# Patient Record
Sex: MALE | Race: WHITE | NOT HISPANIC OR LATINO | URBAN - METROPOLITAN AREA
[De-identification: names, ages, dates, MRNs, and addresses within clinical notes are randomized per-mention and may not be internally consistent; named-entity substitution may affect disease eponyms.]

---

## 2018-11-09 ENCOUNTER — OUTPATIENT (OUTPATIENT)
Dept: OUTPATIENT SERVICES | Facility: HOSPITAL | Age: 38
LOS: 1 days | End: 2018-11-09
Payer: COMMERCIAL

## 2018-11-09 VITALS
HEIGHT: 66 IN | WEIGHT: 171.08 LBS | HEART RATE: 79 BPM | OXYGEN SATURATION: 97 % | DIASTOLIC BLOOD PRESSURE: 80 MMHG | TEMPERATURE: 98 F | RESPIRATION RATE: 16 BRPM | SYSTOLIC BLOOD PRESSURE: 125 MMHG

## 2018-11-09 DIAGNOSIS — Z01.818 ENCOUNTER FOR OTHER PREPROCEDURAL EXAMINATION: ICD-10-CM

## 2018-11-09 DIAGNOSIS — Z98.890 OTHER SPECIFIED POSTPROCEDURAL STATES: Chronic | ICD-10-CM

## 2018-11-09 DIAGNOSIS — K64.9 UNSPECIFIED HEMORRHOIDS: ICD-10-CM

## 2018-11-09 DIAGNOSIS — K62.5 HEMORRHAGE OF ANUS AND RECTUM: ICD-10-CM

## 2018-11-09 DIAGNOSIS — G89.29 OTHER CHRONIC PAIN: ICD-10-CM

## 2018-11-09 LAB
HCT VFR BLD CALC: 45.3 % — SIGNIFICANT CHANGE UP (ref 39–50)
HGB BLD-MCNC: 15 G/DL — SIGNIFICANT CHANGE UP (ref 13–17)
HIV 1+2 AB+HIV1 P24 AG SERPL QL IA: SIGNIFICANT CHANGE UP
MCHC RBC-ENTMCNC: 30.7 PG — SIGNIFICANT CHANGE UP (ref 27–34)
MCHC RBC-ENTMCNC: 33.1 GM/DL — SIGNIFICANT CHANGE UP (ref 32–36)
MCV RBC AUTO: 92.8 FL — SIGNIFICANT CHANGE UP (ref 80–100)
PLATELET # BLD AUTO: 221 K/UL — SIGNIFICANT CHANGE UP (ref 150–400)
RBC # BLD: 4.88 M/UL — SIGNIFICANT CHANGE UP (ref 4.2–5.8)
RBC # FLD: 12.8 % — SIGNIFICANT CHANGE UP (ref 10.3–14.5)
WBC # BLD: 5.21 K/UL — SIGNIFICANT CHANGE UP (ref 3.8–10.5)
WBC # FLD AUTO: 5.21 K/UL — SIGNIFICANT CHANGE UP (ref 3.8–10.5)

## 2018-11-09 PROCEDURE — 85027 COMPLETE CBC AUTOMATED: CPT

## 2018-11-09 PROCEDURE — 87389 HIV-1 AG W/HIV-1&-2 AB AG IA: CPT

## 2018-11-09 PROCEDURE — G0463: CPT

## 2018-11-09 RX ORDER — LIDOCAINE HCL 20 MG/ML
0.2 VIAL (ML) INJECTION ONCE
Qty: 0 | Refills: 0 | Status: DISCONTINUED | OUTPATIENT
Start: 2018-11-20 | End: 2018-12-05

## 2018-11-09 RX ORDER — SODIUM CHLORIDE 9 MG/ML
3 INJECTION INTRAMUSCULAR; INTRAVENOUS; SUBCUTANEOUS EVERY 8 HOURS
Qty: 0 | Refills: 0 | Status: DISCONTINUED | OUTPATIENT
Start: 2018-11-20 | End: 2018-12-05

## 2018-11-09 NOTE — H&P PST ADULT - HISTORY OF PRESENT ILLNESS
38 year old male with a history of hemorrhoids presents for hemorrhoidectomy. Pt. s/p banding procedure which was unsuccessful. denies rectal bleeding.

## 2018-11-09 NOTE — H&P PST ADULT - PMH
Chronic pain of both shoulders  managed by Dr. Horowitz pain management MD, [facility]  Fracture  history of sports related fractures in childhood

## 2018-11-19 ENCOUNTER — TRANSCRIPTION ENCOUNTER (OUTPATIENT)
Age: 38
End: 2018-11-19

## 2018-11-19 RX ORDER — ONDANSETRON 8 MG/1
4 TABLET, FILM COATED ORAL ONCE
Qty: 0 | Refills: 0 | Status: DISCONTINUED | OUTPATIENT
Start: 2018-11-20 | End: 2018-12-05

## 2018-11-19 RX ORDER — CELECOXIB 200 MG/1
200 CAPSULE ORAL ONCE
Qty: 0 | Refills: 0 | Status: DISCONTINUED | OUTPATIENT
Start: 2018-11-20 | End: 2018-12-05

## 2018-11-19 RX ORDER — OXYCODONE HYDROCHLORIDE 5 MG/1
5 TABLET ORAL ONCE
Qty: 0 | Refills: 0 | Status: DISCONTINUED | OUTPATIENT
Start: 2018-11-20 | End: 2018-11-20

## 2018-11-19 RX ORDER — SODIUM CHLORIDE 9 MG/ML
1000 INJECTION, SOLUTION INTRAVENOUS
Qty: 0 | Refills: 0 | Status: DISCONTINUED | OUTPATIENT
Start: 2018-11-20 | End: 2018-12-05

## 2018-11-20 ENCOUNTER — OUTPATIENT (OUTPATIENT)
Dept: OUTPATIENT SERVICES | Facility: HOSPITAL | Age: 38
LOS: 1 days | End: 2018-11-20
Payer: COMMERCIAL

## 2018-11-20 ENCOUNTER — RESULT REVIEW (OUTPATIENT)
Age: 38
End: 2018-11-20

## 2018-11-20 VITALS
TEMPERATURE: 97 F | SYSTOLIC BLOOD PRESSURE: 117 MMHG | HEART RATE: 61 BPM | OXYGEN SATURATION: 99 % | RESPIRATION RATE: 20 BRPM | DIASTOLIC BLOOD PRESSURE: 86 MMHG

## 2018-11-20 VITALS
TEMPERATURE: 98 F | SYSTOLIC BLOOD PRESSURE: 125 MMHG | DIASTOLIC BLOOD PRESSURE: 80 MMHG | HEART RATE: 79 BPM | WEIGHT: 171.08 LBS | OXYGEN SATURATION: 98 % | HEIGHT: 66 IN | RESPIRATION RATE: 16 BRPM

## 2018-11-20 DIAGNOSIS — K62.5 HEMORRHAGE OF ANUS AND RECTUM: ICD-10-CM

## 2018-11-20 DIAGNOSIS — Z98.890 OTHER SPECIFIED POSTPROCEDURAL STATES: Chronic | ICD-10-CM

## 2018-11-20 PROCEDURE — 46260 REMOVE IN/EX HEM GROUPS 2+: CPT

## 2018-11-20 PROCEDURE — 88304 TISSUE EXAM BY PATHOLOGIST: CPT

## 2018-11-20 PROCEDURE — 88304 TISSUE EXAM BY PATHOLOGIST: CPT | Mod: 26

## 2018-11-20 RX ORDER — IBUPROFEN 200 MG
1 TABLET ORAL
Qty: 30 | Refills: 0
Start: 2018-11-20

## 2018-11-20 RX ORDER — CELECOXIB 200 MG/1
200 CAPSULE ORAL ONCE
Qty: 0 | Refills: 0 | Status: COMPLETED | OUTPATIENT
Start: 2018-11-20 | End: 2018-11-20

## 2018-11-20 RX ORDER — ACETAMINOPHEN WITH CODEINE 300MG-30MG
1 TABLET ORAL
Qty: 21 | Refills: 0 | OUTPATIENT
Start: 2018-11-20

## 2018-11-20 RX ORDER — ACETAMINOPHEN 500 MG
1000 TABLET ORAL ONCE
Qty: 0 | Refills: 0 | Status: COMPLETED | OUTPATIENT
Start: 2018-11-20 | End: 2018-11-20

## 2018-11-20 RX ORDER — ACETAMINOPHEN WITH CODEINE 300MG-30MG
1 TABLET ORAL
Qty: 21 | Refills: 0
Start: 2018-11-20

## 2018-11-20 RX ORDER — IBUPROFEN 200 MG
1 TABLET ORAL
Qty: 30 | Refills: 0 | OUTPATIENT
Start: 2018-11-20

## 2018-11-20 RX ADMIN — CELECOXIB 200 MILLIGRAM(S): 200 CAPSULE ORAL at 08:57

## 2018-11-20 RX ADMIN — Medication 1000 MILLIGRAM(S): at 08:57

## 2018-11-20 NOTE — ASU DISCHARGE PLAN (ADULT/PEDIATRIC). - SPECIAL INSTRUCTIONS
Pain control as needed with medications prescribed. Prescriptions have been sent to your pharmacy.    Take sitz baths twice a day and after bowel movements if able.    Please followup with Dr. Nunez in 1 week in office.

## 2018-11-20 NOTE — ASU DISCHARGE PLAN (ADULT/PEDIATRIC). - MEDICATION SUMMARY - MEDICATIONS TO TAKE
I will START or STAY ON the medications listed below when I get home from the hospital:    ibuprofen 800 mg oral tablet  -- 1 tab(s) by mouth every 8 hours, As Needed for pain  -- Do not take this drug if you are pregnant.  It is very important that you take or use this exactly as directed.  Do not skip doses or discontinue unless directed by your doctor.  May cause drowsiness or dizziness.  Obtain medical advice before taking any non-prescription drugs as some may affect the action of this medication.  Take with food or milk.    -- Indication: For Pain    Tylenol with Codeine #3 oral tablet  -- 1 tab(s) by mouth every 8 hours, As Needed for pain MDD:3 tabs  -- Caution federal law prohibits the transfer of this drug to any person other  than the person for whom it was prescribed.  May cause drowsiness.  Alcohol may intensify this effect.  Use care when operating dangerous machinery.  This product contains acetaminophen.  Do not use  with any other product containing acetaminophen to prevent possible liver damage.  Using more of this medication than prescribed may cause serious breathing problems.    -- Indication: For Pain    Marinol  -- 1 tab(s) by mouth once a day (at bedtime)  -- Indication: For Home med

## 2018-11-20 NOTE — PRE-ANESTHESIA EVALUATION ADULT - NSANTHOSAYNRD_GEN_A_CORE
No. DEBBY screening performed.  STOP BANG Legend: 0-2 = LOW Risk; 3-4 = INTERMEDIATE Risk; 5-8 = HIGH Risk

## 2018-11-20 NOTE — ASU DISCHARGE PLAN (ADULT/PEDIATRIC). - NOTIFY
Persistent Nausea and Vomiting/Unable to Urinate/Swelling that continues/Excessive Diarrhea/Pain not relieved by Medications/GYN Fever>100.4/Numbness, tingling/Bleeding that does not stop/Inability to Tolerate Liquids or Foods Excessive Diarrhea/Pain not relieved by Medications/Inability to Tolerate Liquids or Foods/Fever greater than 101/Bleeding that does not stop

## 2018-11-29 LAB — SURGICAL PATHOLOGY STUDY: SIGNIFICANT CHANGE UP

## 2019-06-11 PROBLEM — T14.8XXA OTHER INJURY OF UNSPECIFIED BODY REGION, INITIAL ENCOUNTER: Chronic | Status: ACTIVE | Noted: 2018-11-09

## 2019-06-11 PROBLEM — M25.511 PAIN IN RIGHT SHOULDER: Chronic | Status: ACTIVE | Noted: 2018-11-09

## 2019-07-30 ENCOUNTER — APPOINTMENT (OUTPATIENT)
Dept: GASTROENTEROLOGY | Facility: CLINIC | Age: 39
End: 2019-07-30
Payer: COMMERCIAL

## 2019-07-30 VITALS
TEMPERATURE: 98.6 F | HEART RATE: 72 BPM | WEIGHT: 170 LBS | HEIGHT: 67 IN | OXYGEN SATURATION: 99 % | DIASTOLIC BLOOD PRESSURE: 70 MMHG | BODY MASS INDEX: 26.68 KG/M2 | SYSTOLIC BLOOD PRESSURE: 110 MMHG

## 2019-07-30 DIAGNOSIS — R10.9 UNSPECIFIED ABDOMINAL PAIN: ICD-10-CM

## 2019-07-30 DIAGNOSIS — Z83.79 FAMILY HISTORY OF OTHER DISEASES OF THE DIGESTIVE SYSTEM: ICD-10-CM

## 2019-07-30 DIAGNOSIS — Z82.49 FAMILY HISTORY OF ISCHEMIC HEART DISEASE AND OTHER DISEASES OF THE CIRCULATORY SYSTEM: ICD-10-CM

## 2019-07-30 DIAGNOSIS — Z78.9 OTHER SPECIFIED HEALTH STATUS: ICD-10-CM

## 2019-07-30 DIAGNOSIS — K29.00 ACUTE GASTRITIS W/OUT BLEEDING: ICD-10-CM

## 2019-07-30 DIAGNOSIS — Z87.898 PERSONAL HISTORY OF OTHER SPECIFIED CONDITIONS: ICD-10-CM

## 2019-07-30 PROCEDURE — 99204 OFFICE O/P NEW MOD 45 MIN: CPT

## 2019-07-30 NOTE — PHYSICAL EXAM
[General Appearance - In No Acute Distress] : in no acute distress [General Appearance - Alert] : alert [Sclera] : the sclera and conjunctiva were normal [PERRL With Normal Accommodation] : pupils were equal in size, round, and reactive to light [Extraocular Movements] : extraocular movements were intact [Neck Appearance] : the appearance of the neck was normal [Oropharynx] : the oropharynx was normal [Outer Ear] : the ears and nose were normal in appearance [Jugular Venous Distention Increased] : there was no jugular-venous distention [Thyroid Diffuse Enlargement] : the thyroid was not enlarged [Neck Cervical Mass (___cm)] : no neck mass was observed [Thyroid Nodule] : there were no palpable thyroid nodules [Heart Rate And Rhythm] : heart rate was normal and rhythm regular [Auscultation Breath Sounds / Voice Sounds] : lungs were clear to auscultation bilaterally [Heart Sounds] : normal S1 and S2 [Heart Sounds Gallop] : no gallops [Bowel Sounds] : normal bowel sounds [Murmurs] : no murmurs [Heart Sounds Pericardial Friction Rub] : no pericardial rub [Abdomen Tenderness] : non-tender [Abdomen Mass (___ Cm)] : no abdominal mass palpated [Abdomen Soft] : soft [] : no hepato-splenomegaly [Cervical Lymph Nodes Enlarged Anterior Bilaterally] : anterior cervical [Cervical Lymph Nodes Enlarged Posterior Bilaterally] : posterior cervical [Supraclavicular Lymph Nodes Enlarged Bilaterally] : supraclavicular [No Spinal Tenderness] : no spinal tenderness [No CVA Tenderness] : no ~M costovertebral angle tenderness [Musculoskeletal - Swelling] : no joint swelling seen [Nail Clubbing] : no clubbing  or cyanosis of the fingernails [Abnormal Walk] : normal gait [Sensation] : the sensory exam was normal to light touch and pinprick [Motor Tone] : muscle strength and tone were normal [Deep Tendon Reflexes (DTR)] : deep tendon reflexes were 2+ and symmetric [No Focal Deficits] : no focal deficits

## 2019-07-30 NOTE — HISTORY OF PRESENT ILLNESS
[Heartburn] : denies heartburn [Vomiting] : denies vomiting [Nausea] : denies nausea [Diarrhea] : denies diarrhea [Constipation] : denies constipation [Yellow Skin Or Eyes (Jaundice)] : denies jaundice [Abdominal Swelling] : denies abdominal swelling [Abdominal Pain] : resolved abdominal pain [Rectal Pain] : denies rectal pain [Wt Gain ___ Lbs] : no recent weight gain [Wt Loss ___ Lbs] : no recent weight loss [GERD] : no gastroesophageal reflux disease [Hiatus Hernia] : no hiatus hernia [Peptic Ulcer Disease] : no peptic ulcer disease [Pancreatitis] : no pancreatitis [Cholelithiasis] : no cholelithiasis [Inflammatory Bowel Disease] : no inflammatory bowel disease [Kidney Stone] : no kidney stone [Diverticulitis] : no diverticulitis [Alcohol Abuse] : no alcohol abuse [Irritable Bowel Syndrome] : no irritable bowel syndrome [Abdominal Surgery] : no abdominal surgery [Malignancy] : no malignancy [Appendectomy] : no appendectomy [Cholecystectomy] : no cholecystectomy [de-identified] : 38 year old man approximately 11/2 months ago developed abnormal pain across his abdomen. The pain was crampy and lasted for several seconds. It was intermittent over 3-4 days. It was not associated with any foods and he had no diarrhea, constipation, nausea or vomiting. He denies rectal bleeding, melena or hematemesis. He had hemorrhoid surgery a year ago.

## 2019-09-20 NOTE — ASU PATIENT PROFILE, ADULT - NS SC CAGE ALCOHOL CUT DOWN
Electrodesiccation And Curettage Text: The wound bed was treated with electrodesiccation and curettage after the biopsy was performed. X Size Of Lesion In Cm: 1.3 Notification Instructions: Patient will be notified of biopsy results. However, patient instructed to call the office if not contacted within 2 weeks. Hemostasis: Drysol Render Path Notes In Note?: No Wound Care: Polysporin ointment Additional Anesthesia Volume In Cc (Will Not Render If 0): 0 Post-Care Instructions: I reviewed with the patient in detail post-care instructions. Patient is to keep the biopsy site dry overnight, and then apply bacitracin once daily until healed. Patient may use hydrogen peroxide remove any crusting. Depth Of Biopsy: dermis Dressing: bandage Detail Level: Detailed Cryotherapy Text: The wound bed was treated with cryotherapy after the biopsy was performed. Billing Type: Third-Party Bill Biopsy Type: H and E Silver Nitrate Text: The wound bed was treated with silver nitrate after the biopsy was performed. Anesthesia Volume In Cc: 0.5 Electrodesiccation Text: The wound bed was treated with electrodesiccation after the biopsy was performed. Curettage Text: The wound bed was treated with curettage after the biopsy was performed. Type Of Destruction Used: Curettage Was A Bandage Applied: Yes Biopsy Method: scissors Consent: Written consent was obtained and risks were reviewed including but not limited to scarring, infection, bleeding, scabbing, incomplete removal, nerve damage and allergy to anesthesia. Anesthesia Type: 1% lidocaine with epinephrine Size Of Lesion In Cm: 1.4 no

## 2020-09-20 ENCOUNTER — EMERGENCY (EMERGENCY)
Facility: HOSPITAL | Age: 40
LOS: 0 days | Discharge: HOME | End: 2020-09-20
Attending: STUDENT IN AN ORGANIZED HEALTH CARE EDUCATION/TRAINING PROGRAM
Payer: COMMERCIAL

## 2020-09-20 VITALS
SYSTOLIC BLOOD PRESSURE: 123 MMHG | OXYGEN SATURATION: 97 % | HEART RATE: 65 BPM | RESPIRATION RATE: 16 BRPM | TEMPERATURE: 98 F | DIASTOLIC BLOOD PRESSURE: 83 MMHG

## 2020-09-20 VITALS
OXYGEN SATURATION: 100 % | HEIGHT: 66 IN | SYSTOLIC BLOOD PRESSURE: 120 MMHG | RESPIRATION RATE: 17 BRPM | WEIGHT: 175.05 LBS | HEART RATE: 75 BPM | DIASTOLIC BLOOD PRESSURE: 66 MMHG | TEMPERATURE: 98 F

## 2020-09-20 DIAGNOSIS — G89.29 OTHER CHRONIC PAIN: ICD-10-CM

## 2020-09-20 DIAGNOSIS — M54.5 LOW BACK PAIN: ICD-10-CM

## 2020-09-20 DIAGNOSIS — M25.512 PAIN IN LEFT SHOULDER: ICD-10-CM

## 2020-09-20 DIAGNOSIS — Z98.890 OTHER SPECIFIED POSTPROCEDURAL STATES: Chronic | ICD-10-CM

## 2020-09-20 DIAGNOSIS — M25.511 PAIN IN RIGHT SHOULDER: ICD-10-CM

## 2020-09-20 DIAGNOSIS — Z79.1 LONG TERM (CURRENT) USE OF NON-STEROIDAL ANTI-INFLAMMATORIES (NSAID): ICD-10-CM

## 2020-09-20 PROCEDURE — 72192 CT PELVIS W/O DYE: CPT | Mod: 26

## 2020-09-20 PROCEDURE — 99285 EMERGENCY DEPT VISIT HI MDM: CPT

## 2020-09-20 PROCEDURE — 99284 EMERGENCY DEPT VISIT MOD MDM: CPT

## 2020-09-20 PROCEDURE — 72131 CT LUMBAR SPINE W/O DYE: CPT | Mod: 26

## 2020-09-20 RX ORDER — METHOCARBAMOL 500 MG/1
2 TABLET, FILM COATED ORAL
Qty: 20 | Refills: 0
Start: 2020-09-20

## 2020-09-20 RX ORDER — IBUPROFEN 200 MG
1 TABLET ORAL
Qty: 16 | Refills: 0
Start: 2020-09-20

## 2020-09-20 RX ORDER — LIDOCAINE 4 G/100G
1 CREAM TOPICAL ONCE
Refills: 0 | Status: COMPLETED | OUTPATIENT
Start: 2020-09-20 | End: 2020-09-20

## 2020-09-20 RX ORDER — KETOROLAC TROMETHAMINE 30 MG/ML
15 SYRINGE (ML) INJECTION ONCE
Refills: 0 | Status: DISCONTINUED | OUTPATIENT
Start: 2020-09-20 | End: 2020-09-20

## 2020-09-20 RX ORDER — METHOCARBAMOL 500 MG/1
750 TABLET, FILM COATED ORAL ONCE
Refills: 0 | Status: COMPLETED | OUTPATIENT
Start: 2020-09-20 | End: 2020-09-20

## 2020-09-20 RX ORDER — ACETAMINOPHEN 500 MG
500 TABLET ORAL ONCE
Refills: 0 | Status: COMPLETED | OUTPATIENT
Start: 2020-09-20 | End: 2020-09-20

## 2020-09-20 RX ORDER — LIDOCAINE 4 G/100G
1 CREAM TOPICAL
Qty: 5 | Refills: 0
Start: 2020-09-20 | End: 2020-09-24

## 2020-09-20 RX ORDER — ACETAMINOPHEN 500 MG
1 TABLET ORAL
Qty: 20 | Refills: 0
Start: 2020-09-20

## 2020-09-20 RX ADMIN — Medication 500 MILLIGRAM(S): at 12:01

## 2020-09-20 RX ADMIN — Medication 15 MILLIGRAM(S): at 11:40

## 2020-09-20 RX ADMIN — LIDOCAINE 1 PATCH: 4 CREAM TOPICAL at 11:41

## 2020-09-20 RX ADMIN — METHOCARBAMOL 750 MILLIGRAM(S): 500 TABLET, FILM COATED ORAL at 11:41

## 2020-09-20 NOTE — ED PROVIDER NOTE - CLINICAL SUMMARY MEDICAL DECISION MAKING FREE TEXT BOX
39 year old male coming in w back pain, Trendelenburg gait, most likely disherniation vs muscle spasm vs fracture.  Imaging, pain control, will d/c w orthopedic followup

## 2020-09-20 NOTE — ED PROVIDER NOTE - PATIENT PORTAL LINK FT
You can access the FollowMyHealth Patient Portal offered by [facility] by registering at the following website: [email or web address]. By joining [facility]’s FollowMyHealth portal, you will also be able to view your health information using other applications (apps) compatible with our system.

## 2020-09-20 NOTE — ED PROVIDER NOTE - CARE PROVIDER_API CALL
Ray Salinas  ORTHOPAEDIC SURGERY  [address] [place] [address]  Phone: [phone]  Fax: [phone]  Follow Up Time:

## 2020-09-20 NOTE — ED PROVIDER NOTE - NSFOLLOWUPINSTRUCTIONS_ED_ALL_ED_FT
Statement Selected Discussed results and outcome of testing with the patient.  Patient advised to please follow up with their primary care doctor within the next 24 hours and return to the Emergency Department for worsening symptoms or any other concerns.  Patient advised that their doctor may call  to follow up on the specific results of the tests performed today in the emergency department.  You were diagnosed with back pain, please follow up with spine surgeon.

## 2020-09-20 NOTE — ED PROVIDER NOTE - OBJECTIVE STATEMENT
Pt is a 39 year old male w/PMH of shoulder surgery in 2008 who presents to the ED today for sharp back pain x Monday. Pt c/o lower back pain, hip pain, and hip swelling. Rates pain9/10. Pt states his back gave out when he coughed twice this morning, and that he has had this pain his whole life. Pt has been going to a chiropractor. Pt is ambulatory with assistance of a cane. Denies Took ibuprofen at 7 this morning. Patient denies EtOH/tobacco, but admits to using marijuana.     Chiropractor: Quinton Hoffman, [address] [place] [address]

## 2020-09-20 NOTE — ED PROVIDER NOTE - PMH
Chronic pain of both shoulders  managed by Dr. Horowitz pain management MD, [facility]  Fracture  history of sports related fractures in childhood

## 2020-09-20 NOTE — ED PROVIDER NOTE - NSFOLLOWUPCLINICS_GEN_ALL_ED_FT
An Orthopedic Surgeon  Orthopedic Surgery  .  [place]   Phone:   Fax:   Follow Up Time: 1-3 Days    [facility] Orthopedic Surgery  Orthopedic Surgery  [address] [facility], 3rd & 4th [place] [address]  Phone: [phone]  Fax:   Follow Up Time: 1-3 Days    [facility] [place]  Orthopedics  .  [place]   Phone: [phone]  Fax:   Follow Up Time: 1-3 Days

## 2020-09-20 NOTE — ED PROVIDER NOTE - MUSCULOSKELETAL, MLM
Spine appears normal, range of motion is not limited, no muscle or joint tenderness, pain with ambulation, left hip appears higher than right, no ttp at the T, L, or S spine, normal straight leg raise

## 2020-09-20 NOTE — ED ADULT TRIAGE NOTE - CHIEF COMPLAINT QUOTE
Pt c/o lower back pain chronic, coughed , and started experiencing spasms, and pain with walking, pt 600mg Motrin at 730am,

## 2021-03-26 ENCOUNTER — RESULT REVIEW (OUTPATIENT)
Age: 41
End: 2021-03-26

## 2021-03-26 ENCOUNTER — APPOINTMENT (OUTPATIENT)
Dept: ULTRASOUND IMAGING | Facility: IMAGING CENTER | Age: 41
End: 2021-03-26
Payer: COMMERCIAL

## 2021-03-26 ENCOUNTER — OUTPATIENT (OUTPATIENT)
Dept: OUTPATIENT SERVICES | Facility: HOSPITAL | Age: 41
LOS: 1 days | End: 2021-03-26
Payer: COMMERCIAL

## 2021-03-26 DIAGNOSIS — R59.0 LOCALIZED ENLARGED LYMPH NODES: ICD-10-CM

## 2021-03-26 DIAGNOSIS — Z98.890 OTHER SPECIFIED POSTPROCEDURAL STATES: Chronic | ICD-10-CM

## 2021-03-26 DIAGNOSIS — Z00.8 ENCOUNTER FOR OTHER GENERAL EXAMINATION: ICD-10-CM

## 2021-03-26 PROCEDURE — 88365 INSITU HYBRIDIZATION (FISH): CPT | Mod: 26,59

## 2021-03-26 PROCEDURE — 88342 IMHCHEM/IMCYTCHM 1ST ANTB: CPT | Mod: 26

## 2021-03-26 PROCEDURE — 88305 TISSUE EXAM BY PATHOLOGIST: CPT

## 2021-03-26 PROCEDURE — 88173 CYTOPATH EVAL FNA REPORT: CPT | Mod: 26

## 2021-03-26 PROCEDURE — 88341 IMHCHEM/IMCYTCHM EA ADD ANTB: CPT

## 2021-03-26 PROCEDURE — 88184 FLOWCYTOMETRY/ TC 1 MARKER: CPT

## 2021-03-26 PROCEDURE — 88185 FLOWCYTOMETRY/TC ADD-ON: CPT

## 2021-03-26 PROCEDURE — 88173 CYTOPATH EVAL FNA REPORT: CPT

## 2021-03-26 PROCEDURE — 88172 CYTP DX EVAL FNA 1ST EA SITE: CPT

## 2021-03-26 PROCEDURE — 88342 IMHCHEM/IMCYTCHM 1ST ANTB: CPT

## 2021-03-26 PROCEDURE — 87205 SMEAR GRAM STAIN: CPT

## 2021-03-26 PROCEDURE — 88188 FLOWCYTOMETRY/READ 9-15: CPT

## 2021-03-26 PROCEDURE — 88305 TISSUE EXAM BY PATHOLOGIST: CPT | Mod: 26

## 2021-03-26 PROCEDURE — 20206 BIOPSY MUSCLE PERQ NEEDLE: CPT

## 2021-03-26 PROCEDURE — 76942 ECHO GUIDE FOR BIOPSY: CPT

## 2021-03-26 PROCEDURE — 76942 ECHO GUIDE FOR BIOPSY: CPT | Mod: 26

## 2021-03-26 PROCEDURE — 88365 INSITU HYBRIDIZATION (FISH): CPT

## 2021-03-26 PROCEDURE — 88341 IMHCHEM/IMCYTCHM EA ADD ANTB: CPT | Mod: 26,59

## 2021-03-31 LAB — TM INTERPRETATION: SIGNIFICANT CHANGE UP

## 2021-04-06 ENCOUNTER — APPOINTMENT (OUTPATIENT)
Dept: OTOLARYNGOLOGY | Facility: CLINIC | Age: 41
End: 2021-04-06
Payer: COMMERCIAL

## 2021-04-06 VITALS
BODY MASS INDEX: 27.78 KG/M2 | WEIGHT: 177 LBS | HEIGHT: 67 IN | HEART RATE: 73 BPM | DIASTOLIC BLOOD PRESSURE: 78 MMHG | SYSTOLIC BLOOD PRESSURE: 120 MMHG | OXYGEN SATURATION: 98 %

## 2021-04-06 PROCEDURE — 31575 DIAGNOSTIC LARYNGOSCOPY: CPT

## 2021-04-06 PROCEDURE — 99072 ADDL SUPL MATRL&STAF TM PHE: CPT

## 2021-04-06 PROCEDURE — 99204 OFFICE O/P NEW MOD 45 MIN: CPT | Mod: 25

## 2021-04-06 NOTE — PHYSICAL EXAM
[Midline] : trachea located in midline position [Normal] : no rashes [de-identified] : 3 cm L level 2 LN

## 2021-04-14 ENCOUNTER — APPOINTMENT (OUTPATIENT)
Dept: NUCLEAR MEDICINE | Facility: IMAGING CENTER | Age: 41
End: 2021-04-14
Payer: COMMERCIAL

## 2021-04-14 ENCOUNTER — OUTPATIENT (OUTPATIENT)
Dept: OUTPATIENT SERVICES | Facility: HOSPITAL | Age: 41
LOS: 1 days | End: 2021-04-14
Payer: COMMERCIAL

## 2021-04-14 DIAGNOSIS — C77.0 SECONDARY AND UNSPECIFIED MALIGNANT NEOPLASM OF LYMPH NODES OF HEAD, FACE AND NECK: ICD-10-CM

## 2021-04-14 DIAGNOSIS — Z00.8 ENCOUNTER FOR OTHER GENERAL EXAMINATION: ICD-10-CM

## 2021-04-14 DIAGNOSIS — Z98.890 OTHER SPECIFIED POSTPROCEDURAL STATES: Chronic | ICD-10-CM

## 2021-04-14 PROCEDURE — A9552: CPT

## 2021-04-14 PROCEDURE — 78815 PET IMAGE W/CT SKULL-THIGH: CPT | Mod: 26,PI

## 2021-04-14 PROCEDURE — 78815 PET IMAGE W/CT SKULL-THIGH: CPT

## 2021-04-27 ENCOUNTER — APPOINTMENT (OUTPATIENT)
Dept: OTOLARYNGOLOGY | Facility: CLINIC | Age: 41
End: 2021-04-27
Payer: COMMERCIAL

## 2021-04-27 ENCOUNTER — OUTPATIENT (OUTPATIENT)
Dept: OUTPATIENT SERVICES | Facility: HOSPITAL | Age: 41
LOS: 1 days | End: 2021-04-27
Payer: COMMERCIAL

## 2021-04-27 ENCOUNTER — APPOINTMENT (OUTPATIENT)
Dept: DISASTER EMERGENCY | Facility: CLINIC | Age: 41
End: 2021-04-27

## 2021-04-27 VITALS
RESPIRATION RATE: 18 BRPM | DIASTOLIC BLOOD PRESSURE: 81 MMHG | HEIGHT: 66.5 IN | SYSTOLIC BLOOD PRESSURE: 120 MMHG | HEART RATE: 65 BPM | TEMPERATURE: 97 F | WEIGHT: 176.37 LBS | OXYGEN SATURATION: 99 %

## 2021-04-27 DIAGNOSIS — Z98.890 OTHER SPECIFIED POSTPROCEDURAL STATES: Chronic | ICD-10-CM

## 2021-04-27 DIAGNOSIS — Z01.818 ENCOUNTER FOR OTHER PREPROCEDURAL EXAMINATION: ICD-10-CM

## 2021-04-27 DIAGNOSIS — C76.0 MALIGNANT NEOPLASM OF HEAD, FACE AND NECK: ICD-10-CM

## 2021-04-27 DIAGNOSIS — G47.00 INSOMNIA, UNSPECIFIED: ICD-10-CM

## 2021-04-27 LAB
ANION GAP SERPL CALC-SCNC: 5 MMOL/L — SIGNIFICANT CHANGE UP (ref 5–17)
BUN SERPL-MCNC: 18 MG/DL — SIGNIFICANT CHANGE UP (ref 7–23)
CALCIUM SERPL-MCNC: 8.9 MG/DL — SIGNIFICANT CHANGE UP (ref 8.4–10.5)
CHLORIDE SERPL-SCNC: 107 MMOL/L — SIGNIFICANT CHANGE UP (ref 96–108)
CO2 SERPL-SCNC: 27 MMOL/L — SIGNIFICANT CHANGE UP (ref 22–31)
CREAT SERPL-MCNC: 1.12 MG/DL — SIGNIFICANT CHANGE UP (ref 0.5–1.3)
GLUCOSE SERPL-MCNC: 99 MG/DL — SIGNIFICANT CHANGE UP (ref 70–99)
HCT VFR BLD CALC: 44.6 % — SIGNIFICANT CHANGE UP (ref 39–50)
HGB BLD-MCNC: 15 G/DL — SIGNIFICANT CHANGE UP (ref 13–17)
MCHC RBC-ENTMCNC: 31.3 PG — SIGNIFICANT CHANGE UP (ref 27–34)
MCHC RBC-ENTMCNC: 33.6 GM/DL — SIGNIFICANT CHANGE UP (ref 32–36)
MCV RBC AUTO: 92.9 FL — SIGNIFICANT CHANGE UP (ref 80–100)
NRBC # BLD: 0 /100 WBCS — SIGNIFICANT CHANGE UP (ref 0–0)
PLATELET # BLD AUTO: 224 K/UL — SIGNIFICANT CHANGE UP (ref 150–400)
POTASSIUM SERPL-MCNC: 4.5 MMOL/L — SIGNIFICANT CHANGE UP (ref 3.5–5.3)
POTASSIUM SERPL-SCNC: 4.5 MMOL/L — SIGNIFICANT CHANGE UP (ref 3.5–5.3)
RBC # BLD: 4.8 M/UL — SIGNIFICANT CHANGE UP (ref 4.2–5.8)
RBC # FLD: 12.4 % — SIGNIFICANT CHANGE UP (ref 10.3–14.5)
SARS-COV-2 RNA SPEC QL NAA+PROBE: SIGNIFICANT CHANGE UP
SODIUM SERPL-SCNC: 139 MMOL/L — SIGNIFICANT CHANGE UP (ref 135–145)
WBC # BLD: 4.57 K/UL — SIGNIFICANT CHANGE UP (ref 3.8–10.5)
WBC # FLD AUTO: 4.57 K/UL — SIGNIFICANT CHANGE UP (ref 3.8–10.5)

## 2021-04-27 PROCEDURE — 36415 COLL VENOUS BLD VENIPUNCTURE: CPT

## 2021-04-27 PROCEDURE — 71046 X-RAY EXAM CHEST 2 VIEWS: CPT | Mod: 26

## 2021-04-27 PROCEDURE — G0463: CPT

## 2021-04-27 PROCEDURE — 80048 BASIC METABOLIC PNL TOTAL CA: CPT

## 2021-04-27 PROCEDURE — 85027 COMPLETE CBC AUTOMATED: CPT

## 2021-04-27 PROCEDURE — 71046 X-RAY EXAM CHEST 2 VIEWS: CPT

## 2021-04-27 PROCEDURE — 87635 SARS-COV-2 COVID-19 AMP PRB: CPT

## 2021-04-27 PROCEDURE — 99211 OFF/OP EST MAY X REQ PHY/QHP: CPT | Mod: 95

## 2021-04-27 NOTE — H&P PST ADULT - NEGATIVE ENMT SYMPTOMS
no sinus symptoms/no nasal congestion/no nasal discharge/no nasal obstruction/no post-nasal discharge/no throat pain

## 2021-04-27 NOTE — H&P PST ADULT - NSICDXPASTSURGICALHX_GEN_ALL_CORE_FT
PAST SURGICAL HISTORY:  History of arthroscopy of both shoulders left and right, right  replacement with "No metal"     PAST SURGICAL HISTORY:  H/O hemorrhoidectomy 2019    History of arthroscopy of both shoulders left and right, right  replacement with "No metal"

## 2021-04-27 NOTE — H&P PST ADULT - NSANTHOSAYNRD_GEN_A_CORE
neck 15.5 inches/No. DEBBY screening performed.  STOP BANG Legend: 0-2 = LOW Risk; 3-4 = INTERMEDIATE Risk; 5-8 = HIGH Risk

## 2021-04-27 NOTE — H&P PST ADULT - NSICDXPASTMEDICALHX_GEN_ALL_CORE_FT
PAST MEDICAL HISTORY:  Chronic pain of both shoulders managed [facility] Dr. Hor[facility] pain management [facility], [facility]    Fracture history of sports related fractures in childhood    Insomnia      PAST MEDICAL HISTORY:  Chronic pain of both shoulders managed by Dr. Horowitz pain management MD, [facility]    COVID-19 virus antibody detected patient reports possible mild case which did not require hospitalization in 3/2020    Fracture history of sports related fractures in childhood    Insomnia

## 2021-04-27 NOTE — H&P PST ADULT - HISTORY OF PRESENT ILLNESS
41 y/o male, currently using medical marijuana and/or vapes marijuana daily, with PMH of COVID-19 antibodies presents for PST and COVID-19 testing.  Patient reports finding a lump on left side of neck while shaving.  Went to ENT and had additional work up including PET scan and biopsy.  As per patient the results show cancer.  Now scheduled for direct laryngoscopy w/ biopsy, poss tonsillectomy with Dr Meek on 04/30/2021.

## 2021-04-27 NOTE — H&P PST ADULT - NSICDXPROBLEM_GEN_ALL_CORE_FT
PROBLEM DIAGNOSES  Problem: Insomnia  Assessment and Plan: Takes medication as prescribed.  Instructed to avoid marjiuana use day before surgery     Problem: Malignant neoplasm of head, face and neck  Assessment and Plan: Scheduled for direct laryngoscopy w/biopsy, poss tonsillectomy on 04/30/2021.  CBC, BMP, Chest x-ray and COVID-19 pending.  Pre op instructions given and patient verbalized understanding.  Patient instructed to avoid vaping and utilizing marjiuana prior to procedure.

## 2021-04-29 ENCOUNTER — TRANSCRIPTION ENCOUNTER (OUTPATIENT)
Age: 41
End: 2021-04-29

## 2021-04-30 ENCOUNTER — APPOINTMENT (OUTPATIENT)
Dept: OTOLARYNGOLOGY | Facility: HOSPITAL | Age: 41
End: 2021-04-30

## 2021-04-30 ENCOUNTER — RESULT REVIEW (OUTPATIENT)
Age: 41
End: 2021-04-30

## 2021-04-30 ENCOUNTER — OUTPATIENT (OUTPATIENT)
Dept: OUTPATIENT SERVICES | Facility: HOSPITAL | Age: 41
LOS: 1 days | End: 2021-04-30
Payer: COMMERCIAL

## 2021-04-30 VITALS
OXYGEN SATURATION: 98 % | DIASTOLIC BLOOD PRESSURE: 87 MMHG | HEART RATE: 64 BPM | RESPIRATION RATE: 20 BRPM | TEMPERATURE: 98 F | SYSTOLIC BLOOD PRESSURE: 133 MMHG | HEIGHT: 66.5 IN | WEIGHT: 176.37 LBS

## 2021-04-30 VITALS
HEART RATE: 60 BPM | DIASTOLIC BLOOD PRESSURE: 64 MMHG | RESPIRATION RATE: 16 BRPM | OXYGEN SATURATION: 98 % | TEMPERATURE: 98 F | SYSTOLIC BLOOD PRESSURE: 104 MMHG

## 2021-04-30 DIAGNOSIS — Z98.890 OTHER SPECIFIED POSTPROCEDURAL STATES: Chronic | ICD-10-CM

## 2021-04-30 DIAGNOSIS — C76.0 MALIGNANT NEOPLASM OF HEAD, FACE AND NECK: ICD-10-CM

## 2021-04-30 PROBLEM — R76.8 OTHER SPECIFIED ABNORMAL IMMUNOLOGICAL FINDINGS IN SERUM: Chronic | Status: ACTIVE | Noted: 2021-04-27

## 2021-04-30 PROCEDURE — 88305 TISSUE EXAM BY PATHOLOGIST: CPT | Mod: 26

## 2021-04-30 PROCEDURE — 88331 PATH CONSLTJ SURG 1 BLK 1SPC: CPT

## 2021-04-30 PROCEDURE — 31536 LARYNGOSCOPY W/BX & OP SCOPE: CPT

## 2021-04-30 PROCEDURE — ZZZZZ: CPT

## 2021-04-30 PROCEDURE — 88305 TISSUE EXAM BY PATHOLOGIST: CPT

## 2021-04-30 PROCEDURE — 88331 PATH CONSLTJ SURG 1 BLK 1SPC: CPT | Mod: 26

## 2021-04-30 RX ORDER — ONDANSETRON 8 MG/1
4 TABLET, FILM COATED ORAL ONCE
Refills: 0 | Status: DISCONTINUED | OUTPATIENT
Start: 2021-04-30 | End: 2021-04-30

## 2021-04-30 RX ORDER — HYDROMORPHONE HYDROCHLORIDE 2 MG/ML
0.5 INJECTION INTRAMUSCULAR; INTRAVENOUS; SUBCUTANEOUS
Refills: 0 | Status: DISCONTINUED | OUTPATIENT
Start: 2021-04-30 | End: 2021-04-30

## 2021-04-30 RX ORDER — SODIUM CHLORIDE 9 MG/ML
1000 INJECTION, SOLUTION INTRAVENOUS
Refills: 0 | Status: DISCONTINUED | OUTPATIENT
Start: 2021-04-30 | End: 2021-04-30

## 2021-04-30 RX ADMIN — SODIUM CHLORIDE 50 MILLILITER(S): 9 INJECTION, SOLUTION INTRAVENOUS at 12:22

## 2021-04-30 NOTE — ASU DISCHARGE PLAN (ADULT/PEDIATRIC) - CARE PROVIDER_API CALL
Mainor Meek)  Otolaryngology  [address] [place] [address]  Phone: [phone]  Fax: [phone]  Follow Up Time:

## 2021-04-30 NOTE — ASU PATIENT PROFILE, ADULT - REASON FOR ADMISSION, PROFILE
on phonation/Class II - visualization of the soft palate, fauces, and uvula "Biopsy and possible tonsillectomy"

## 2021-04-30 NOTE — BRIEF OPERATIVE NOTE - ANTIBIOTIC PROTOCOL
Anticoagulation Summary as of 6/9/2017     INR goal 2.0-3.0   Selected INR 3.2! (6/8/2017)   Maintenance plan 5 mg (5 mg x 1) on Wed; 7.5 mg (5 mg x 1.5) all other days   Weekly total 50 mg   Plan last modified Babita Sarmiento (10/18/2016)   Next INR check 6/22/2017   Priority Maintenance   Target end date     Indications   Deep vein thrombosis [453.40] [I82.409]         Anticoagulation Episode Summary     INR check location Home Draw    Preferred lab     Send INR reminders to     Nuria MYRICK      Anticoagulation Care Providers     Provider Role Specialty Phone number    Bruna Ureña M.D. Referring Cardiology [phone]    Kiesha Barr, LEOND Responsible          Anticoagulation Patient Findings    Left message for patient to decrease warfarin dose to 5 mg tonight then resume previously prescribed regimen. Follow up INR in two weeks.    Nathan Winter, PHARMD    
Followed protocol

## 2021-04-30 NOTE — ASU PATIENT PROFILE, ADULT - PMH
Chronic pain of both shoulders  [facility] management [facility], [facility]  COVID-19 virus antibody detected  patient reports possible mild case which did not require hospitalization in 3/2020  Fracture  history of sports related fractures in childhood  Insomnia     Chronic pain of both shoulders  managed by Dr. Horowitz pain management MD, [facility]  COVID-19 virus antibody detected  patient reports possible mild case which did not require hospitalization in 3/2020  Fracture  history of sports related fractures in childhood

## 2021-04-30 NOTE — ASU PATIENT PROFILE, ADULT - PSH
Adenocarcinoma H/O hemorrhoidectomy  2019  History of arthroscopy of both shoulders  left and right, right  replacement with "No metal"

## 2021-04-30 NOTE — BRIEF OPERATIVE NOTE - NSICDXBRIEFPROCEDURE_GEN_ALL_CORE_FT
PROCEDURES:  Direct laryngoscopy with excision of tissue 30-Apr-2021 16:02:26 left base of tongue Ana Rainey

## 2021-04-30 NOTE — ASU DISCHARGE PLAN (ADULT/PEDIATRIC) - NURSING INSTRUCTIONS
All discharge information reviewed with patient including pain, safety, medication and follow up care and soft diet for 2 weeks

## 2021-04-30 NOTE — ASU DISCHARGE PLAN (ADULT/PEDIATRIC) - ASU DC SPECIAL INSTRUCTIONSFT
TAKE PERCOCET FOR SEVERE PAIN, OTHERWISE JUST TAKE TYLENOL    DO NOT DRIVE WHILE TAKING PERCOCET    NO HEAVY LIFTING, BENDING OR STRAINING    EAT SOFT FOODS FOR 2 WEEKS    FOLLOW UP WITH DR. KENYON IN 2 WEEK, PLEASE CALL THE OFFICE FOR AN APPOINTMENT TAKE PERCOCET FOR SEVERE PAIN, OTHERWISE JUST TAKE TYLENOL    DO NOT DRIVE WHILE TAKING PERCOCET    NO HEAVY LIFTING, BENDING OR STRAINING    EAT SOFT FOODS FOR 2 WEEKS    FOLLOW UP WITH DR. KENYON IN 2 WEEKS, PLEASE CALL THE OFFICE FOR AN APPOINTMENT

## 2021-05-11 ENCOUNTER — APPOINTMENT (OUTPATIENT)
Dept: OTOLARYNGOLOGY | Facility: CLINIC | Age: 41
End: 2021-05-11
Payer: COMMERCIAL

## 2021-05-11 VITALS
HEIGHT: 67 IN | WEIGHT: 179 LBS | BODY MASS INDEX: 28.09 KG/M2 | DIASTOLIC BLOOD PRESSURE: 85 MMHG | SYSTOLIC BLOOD PRESSURE: 119 MMHG | OXYGEN SATURATION: 99 % | HEART RATE: 80 BPM

## 2021-05-11 PROCEDURE — 99072 ADDL SUPL MATRL&STAF TM PHE: CPT

## 2021-05-11 PROCEDURE — 99214 OFFICE O/P EST MOD 30 MIN: CPT

## 2021-05-11 NOTE — PHYSICAL EXAM
[de-identified] : 3 cm L level 2 LN [Midline] : trachea located in midline position [Normal] : no rashes

## 2021-05-11 NOTE — HISTORY OF PRESENT ILLNESS
[de-identified] : 40 yro pt referred by Dr. Cherry for eval of left neck mass that was positive for carcinoma on FNA. Pt here for f/up s/p DL with biopsy 4/30/2021. Today pt c/o some irritation and pain with swallowing. \par Denies neck pain, hemoptysis, dyspnea, dysphonia. No fever, chills, weakness, weight loss. Eating and drinking without issues, but mostly on soft diet. Pt smokes medical marijuana daily for over 20 years and has also been using edibles. \par \par surgical path 4/30/21: Final Diagnosis\par \par 1.  Tongue, left base, biopsy\par - Tonsillar-type lymphoepithelial mucosa with no malignancy identified.\par \par 2.  Tonsil, left, biopsy\par - Tonsillar-type lymphoepithelial mucosa with focus suspicious for moderate dysplasia.\par \par 3.  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

## 2021-05-11 NOTE — CONSULT LETTER
[Dear  ___] : Dear  [unfilled], [Courtesy Letter:] : I had the pleasure of seeing your patient, [unfilled], in my office today. [Please see my note below.] : Please see my note below. [Consult Closing:] : Thank you very much for allowing me to participate in the care of this patient.  If you have any questions, please do not hesitate to contact me. [Sincerely,] : Sincerely, [FreeTextEntry2] : Dr John Cherry.  [FreeTextEntry3] : \par Mainor Meek MD, FACS\par \par Otolaryngology-Head and Neck Surgery\par [employer] [facility]\par

## 2021-05-18 ENCOUNTER — OUTPATIENT (OUTPATIENT)
Dept: OUTPATIENT SERVICES | Facility: HOSPITAL | Age: 41
LOS: 1 days | End: 2021-05-18

## 2021-05-18 VITALS
RESPIRATION RATE: 14 BRPM | DIASTOLIC BLOOD PRESSURE: 74 MMHG | HEIGHT: 68 IN | OXYGEN SATURATION: 98 % | SYSTOLIC BLOOD PRESSURE: 102 MMHG | HEART RATE: 62 BPM | WEIGHT: 177.91 LBS | TEMPERATURE: 97 F

## 2021-05-18 DIAGNOSIS — C09.9 MALIGNANT NEOPLASM OF TONSIL, UNSPECIFIED: ICD-10-CM

## 2021-05-18 DIAGNOSIS — Z98.890 OTHER SPECIFIED POSTPROCEDURAL STATES: Chronic | ICD-10-CM

## 2021-05-18 DIAGNOSIS — D49.0 NEOPLASM OF UNSPECIFIED BEHAVIOR OF DIGESTIVE SYSTEM: Chronic | ICD-10-CM

## 2021-05-18 LAB
ALBUMIN SERPL ELPH-MCNC: 4.4 G/DL — SIGNIFICANT CHANGE UP (ref 3.3–5)
ALP SERPL-CCNC: 103 U/L — SIGNIFICANT CHANGE UP (ref 40–120)
ALT FLD-CCNC: 74 U/L — HIGH (ref 4–41)
ANION GAP SERPL CALC-SCNC: 12 MMOL/L — SIGNIFICANT CHANGE UP (ref 7–14)
AST SERPL-CCNC: 24 U/L — SIGNIFICANT CHANGE UP (ref 4–40)
BILIRUB SERPL-MCNC: 0.9 MG/DL — SIGNIFICANT CHANGE UP (ref 0.2–1.2)
BLD GP AB SCN SERPL QL: NEGATIVE — SIGNIFICANT CHANGE UP
BUN SERPL-MCNC: 17 MG/DL — SIGNIFICANT CHANGE UP (ref 7–23)
CALCIUM SERPL-MCNC: 9.2 MG/DL — SIGNIFICANT CHANGE UP (ref 8.4–10.5)
CHLORIDE SERPL-SCNC: 104 MMOL/L — SIGNIFICANT CHANGE UP (ref 98–107)
CO2 SERPL-SCNC: 23 MMOL/L — SIGNIFICANT CHANGE UP (ref 22–31)
CREAT SERPL-MCNC: 0.97 MG/DL — SIGNIFICANT CHANGE UP (ref 0.5–1.3)
GLUCOSE SERPL-MCNC: 92 MG/DL — SIGNIFICANT CHANGE UP (ref 70–99)
HCT VFR BLD CALC: 44.1 % — SIGNIFICANT CHANGE UP (ref 39–50)
HGB BLD-MCNC: 14.5 G/DL — SIGNIFICANT CHANGE UP (ref 13–17)
MCHC RBC-ENTMCNC: 30.6 PG — SIGNIFICANT CHANGE UP (ref 27–34)
MCHC RBC-ENTMCNC: 32.9 GM/DL — SIGNIFICANT CHANGE UP (ref 32–36)
MCV RBC AUTO: 93 FL — SIGNIFICANT CHANGE UP (ref 80–100)
NRBC # BLD: 0 /100 WBCS — SIGNIFICANT CHANGE UP
NRBC # FLD: 0 K/UL — SIGNIFICANT CHANGE UP
PLATELET # BLD AUTO: 229 K/UL — SIGNIFICANT CHANGE UP (ref 150–400)
POTASSIUM SERPL-MCNC: 4.1 MMOL/L — SIGNIFICANT CHANGE UP (ref 3.5–5.3)
POTASSIUM SERPL-SCNC: 4.1 MMOL/L — SIGNIFICANT CHANGE UP (ref 3.5–5.3)
PROT SERPL-MCNC: 6.8 G/DL — SIGNIFICANT CHANGE UP (ref 6–8.3)
RBC # BLD: 4.74 M/UL — SIGNIFICANT CHANGE UP (ref 4.2–5.8)
RBC # FLD: 12.4 % — SIGNIFICANT CHANGE UP (ref 10.3–14.5)
RH IG SCN BLD-IMP: POSITIVE — SIGNIFICANT CHANGE UP
SODIUM SERPL-SCNC: 139 MMOL/L — SIGNIFICANT CHANGE UP (ref 135–145)
WBC # BLD: 4.45 K/UL — SIGNIFICANT CHANGE UP (ref 3.8–10.5)
WBC # FLD AUTO: 4.45 K/UL — SIGNIFICANT CHANGE UP (ref 3.8–10.5)

## 2021-05-18 RX ORDER — SODIUM CHLORIDE 9 MG/ML
1000 INJECTION, SOLUTION INTRAVENOUS
Refills: 0 | Status: DISCONTINUED | OUTPATIENT
Start: 2021-05-26 | End: 2021-05-26

## 2021-05-18 NOTE — H&P PST ADULT - NSICDXPASTMEDICALHX_GEN_ALL_CORE_FT
PAST MEDICAL HISTORY:  Chronic pain of both shoulders managed by Dr. Horowitz pain management MD, [facility]    COVID-19 virus antibody detected patient reports possible mild case which did not require hospitalization in 3/2020    Fracture history of sports related fractures in childhood     PAST MEDICAL HISTORY:  Chronic pain of both shoulders managed by Dr. Horoiwtz pain management MD, [facility]    COVID-19 virus antibody detected patient reports possible mild case which did not require hospitalization in 3/2020    Fracture history of sports related fractures in childhood    Malignant neoplasm of tonsil

## 2021-05-18 NOTE — H&P PST ADULT - NSICDXPASTSURGICALHX_GEN_ALL_CORE_FT
PAST SURGICAL HISTORY:  H/O hemorrhoidectomy 2019    History of arthroscopy of both shoulders left and right, right  replacement with "No metal"     PAST SURGICAL HISTORY:  H/O hemorrhoidectomy 2019    History of arthroscopy of both shoulders left 2005, right 2008    Tonsil neoplasm biopsy 2021

## 2021-05-23 ENCOUNTER — APPOINTMENT (OUTPATIENT)
Dept: DISASTER EMERGENCY | Facility: CLINIC | Age: 41
End: 2021-05-23

## 2021-05-24 PROBLEM — C09.9 MALIGNANT NEOPLASM OF TONSIL, UNSPECIFIED: Chronic | Status: ACTIVE | Noted: 2021-05-18

## 2021-05-24 LAB — SARS-COV-2 N GENE NPH QL NAA+PROBE: NOT DETECTED

## 2021-05-25 ENCOUNTER — TRANSCRIPTION ENCOUNTER (OUTPATIENT)
Age: 41
End: 2021-05-25

## 2021-05-25 RX ORDER — DRONABINOL 2.5 MG
1 CAPSULE ORAL
Qty: 0 | Refills: 0 | DISCHARGE

## 2021-05-25 NOTE — ASU PATIENT PROFILE, ADULT - PMH
Chronic pain of both shoulders  managed by Dr. Horowitz pain management MD, [facility]  COVID-19 virus antibody detected  patient reports possible mild case which did not require hospitalization in 3/2020  Fracture  history of sports related fractures in childhood  Malignant neoplasm of tonsil

## 2021-05-25 NOTE — ASU PATIENT PROFILE, ADULT - PSH
H/O hemorrhoidectomy  2019  History of arthroscopy of both shoulders  left 2005, right 2008  Tonsil neoplasm  biopsy 2021

## 2021-05-26 ENCOUNTER — RESULT REVIEW (OUTPATIENT)
Age: 41
End: 2021-05-26

## 2021-05-26 ENCOUNTER — APPOINTMENT (OUTPATIENT)
Dept: OTOLARYNGOLOGY | Facility: HOSPITAL | Age: 41
End: 2021-05-26

## 2021-05-26 ENCOUNTER — INPATIENT (INPATIENT)
Facility: HOSPITAL | Age: 41
LOS: 2 days | Discharge: HOME | End: 2021-05-29
Attending: OTOLARYNGOLOGY | Admitting: OTOLARYNGOLOGY
Payer: COMMERCIAL

## 2021-05-26 VITALS
OXYGEN SATURATION: 98 % | HEART RATE: 70 BPM | HEIGHT: 68 IN | SYSTOLIC BLOOD PRESSURE: 106 MMHG | DIASTOLIC BLOOD PRESSURE: 72 MMHG | WEIGHT: 177.91 LBS | RESPIRATION RATE: 15 BRPM | TEMPERATURE: 98 F

## 2021-05-26 DIAGNOSIS — Z98.890 OTHER SPECIFIED POSTPROCEDURAL STATES: Chronic | ICD-10-CM

## 2021-05-26 DIAGNOSIS — D49.0 NEOPLASM OF UNSPECIFIED BEHAVIOR OF DIGESTIVE SYSTEM: Chronic | ICD-10-CM

## 2021-05-26 DIAGNOSIS — C09.9 MALIGNANT NEOPLASM OF TONSIL, UNSPECIFIED: ICD-10-CM

## 2021-05-26 PROCEDURE — 38724 REMOVAL OF LYMPH NODES NECK: CPT

## 2021-05-26 PROCEDURE — 88342 IMHCHEM/IMCYTCHM 1ST ANTB: CPT | Mod: 26,59

## 2021-05-26 PROCEDURE — 42844 EXTENSIVE SURGERY OF THROAT: CPT

## 2021-05-26 PROCEDURE — 42890 LIMITED PHARYNGECTOMY: CPT

## 2021-05-26 PROCEDURE — 88307 TISSUE EXAM BY PATHOLOGIST: CPT | Mod: 26

## 2021-05-26 PROCEDURE — 88305 TISSUE EXAM BY PATHOLOGIST: CPT | Mod: 26

## 2021-05-26 PROCEDURE — 88331 PATH CONSLTJ SURG 1 BLK 1SPC: CPT | Mod: 26

## 2021-05-26 PROCEDURE — 88365 INSITU HYBRIDIZATION (FISH): CPT | Mod: 26,59

## 2021-05-26 PROCEDURE — 88332 PATH CONSLTJ SURG EA ADD BLK: CPT | Mod: 26

## 2021-05-26 PROCEDURE — 88367 INSITU HYBRIDIZATION AUTO: CPT | Mod: 26

## 2021-05-26 PROCEDURE — 41120 PARTIAL REMOVAL OF TONGUE: CPT

## 2021-05-26 RX ORDER — HEPARIN SODIUM 5000 [USP'U]/ML
5000 INJECTION INTRAVENOUS; SUBCUTANEOUS EVERY 8 HOURS
Refills: 0 | Status: DISCONTINUED | OUTPATIENT
Start: 2021-05-27 | End: 2021-05-29

## 2021-05-26 RX ORDER — ACETAMINOPHEN 500 MG
1000 TABLET ORAL ONCE
Refills: 0 | Status: DISCONTINUED | OUTPATIENT
Start: 2021-05-27 | End: 2021-05-27

## 2021-05-26 RX ORDER — METOCLOPRAMIDE HCL 10 MG
10 TABLET ORAL ONCE
Refills: 0 | Status: COMPLETED | OUTPATIENT
Start: 2021-05-26 | End: 2021-05-26

## 2021-05-26 RX ORDER — ONDANSETRON 8 MG/1
4 TABLET, FILM COATED ORAL ONCE
Refills: 0 | Status: COMPLETED | OUTPATIENT
Start: 2021-05-26 | End: 2021-05-26

## 2021-05-26 RX ORDER — HYDROMORPHONE HYDROCHLORIDE 2 MG/ML
1 INJECTION INTRAMUSCULAR; INTRAVENOUS; SUBCUTANEOUS EVERY 6 HOURS
Refills: 0 | Status: DISCONTINUED | OUTPATIENT
Start: 2021-05-26 | End: 2021-05-27

## 2021-05-26 RX ORDER — METOCLOPRAMIDE HCL 10 MG
10 TABLET ORAL EVERY 6 HOURS
Refills: 0 | Status: DISCONTINUED | OUTPATIENT
Start: 2021-05-26 | End: 2021-05-27

## 2021-05-26 RX ORDER — DEXAMETHASONE 0.5 MG/5ML
10 ELIXIR ORAL EVERY 6 HOURS
Refills: 0 | Status: COMPLETED | OUTPATIENT
Start: 2021-05-26 | End: 2021-05-27

## 2021-05-26 RX ORDER — SODIUM CHLORIDE 9 MG/ML
1000 INJECTION, SOLUTION INTRAVENOUS
Refills: 0 | Status: DISCONTINUED | OUTPATIENT
Start: 2021-05-26 | End: 2021-05-29

## 2021-05-26 RX ORDER — CEFAZOLIN SODIUM 1 G
VIAL (EA) INJECTION
Refills: 0 | Status: COMPLETED | OUTPATIENT
Start: 2021-05-26 | End: 2021-05-27

## 2021-05-26 RX ORDER — CEFAZOLIN SODIUM 1 G
1000 VIAL (EA) INJECTION EVERY 8 HOURS
Refills: 0 | Status: COMPLETED | OUTPATIENT
Start: 2021-05-27 | End: 2021-05-27

## 2021-05-26 RX ORDER — ONDANSETRON 8 MG/1
4 TABLET, FILM COATED ORAL EVERY 6 HOURS
Refills: 0 | Status: DISCONTINUED | OUTPATIENT
Start: 2021-05-26 | End: 2021-05-27

## 2021-05-26 RX ORDER — ACETAMINOPHEN 500 MG
1000 TABLET ORAL ONCE
Refills: 0 | Status: DISCONTINUED | OUTPATIENT
Start: 2021-05-26 | End: 2021-05-27

## 2021-05-26 RX ORDER — HYDROMORPHONE HYDROCHLORIDE 2 MG/ML
1 INJECTION INTRAMUSCULAR; INTRAVENOUS; SUBCUTANEOUS
Refills: 0 | Status: DISCONTINUED | OUTPATIENT
Start: 2021-05-26 | End: 2021-05-26

## 2021-05-26 RX ORDER — HYDROMORPHONE HYDROCHLORIDE 2 MG/ML
0.5 INJECTION INTRAMUSCULAR; INTRAVENOUS; SUBCUTANEOUS EVERY 6 HOURS
Refills: 0 | Status: DISCONTINUED | OUTPATIENT
Start: 2021-05-26 | End: 2021-05-27

## 2021-05-26 RX ORDER — HYDROMORPHONE HYDROCHLORIDE 2 MG/ML
0.5 INJECTION INTRAMUSCULAR; INTRAVENOUS; SUBCUTANEOUS
Refills: 0 | Status: DISCONTINUED | OUTPATIENT
Start: 2021-05-26 | End: 2021-05-26

## 2021-05-26 RX ORDER — CEFAZOLIN SODIUM 1 G
1000 VIAL (EA) INJECTION ONCE
Refills: 0 | Status: COMPLETED | OUTPATIENT
Start: 2021-05-26 | End: 2021-05-26

## 2021-05-26 RX ORDER — HEPARIN SODIUM 5000 [USP'U]/ML
5000 INJECTION INTRAVENOUS; SUBCUTANEOUS EVERY 8 HOURS
Refills: 0 | Status: DISCONTINUED | OUTPATIENT
Start: 2021-05-26 | End: 2021-05-26

## 2021-05-26 RX ADMIN — Medication 102 MILLIGRAM(S): at 17:48

## 2021-05-26 RX ADMIN — ONDANSETRON 4 MILLIGRAM(S): 8 TABLET, FILM COATED ORAL at 12:33

## 2021-05-26 RX ADMIN — HYDROMORPHONE HYDROCHLORIDE 0.5 MILLIGRAM(S): 2 INJECTION INTRAMUSCULAR; INTRAVENOUS; SUBCUTANEOUS at 14:02

## 2021-05-26 RX ADMIN — SODIUM CHLORIDE 100 MILLILITER(S): 9 INJECTION, SOLUTION INTRAVENOUS at 20:50

## 2021-05-26 RX ADMIN — Medication 10 MILLIGRAM(S): at 12:47

## 2021-05-26 RX ADMIN — Medication 202 MILLIGRAM(S): at 13:24

## 2021-05-26 RX ADMIN — Medication 102 MILLIGRAM(S): at 23:20

## 2021-05-26 RX ADMIN — Medication 100 MILLIGRAM(S): at 20:50

## 2021-05-26 RX ADMIN — HYDROMORPHONE HYDROCHLORIDE 0.5 MILLIGRAM(S): 2 INJECTION INTRAMUSCULAR; INTRAVENOUS; SUBCUTANEOUS at 14:30

## 2021-05-27 LAB
ANION GAP SERPL CALC-SCNC: 13 MMOL/L — SIGNIFICANT CHANGE UP (ref 7–14)
BUN SERPL-MCNC: 14 MG/DL — SIGNIFICANT CHANGE UP (ref 7–23)
CALCIUM SERPL-MCNC: 9.4 MG/DL — SIGNIFICANT CHANGE UP (ref 8.4–10.5)
CHLORIDE SERPL-SCNC: 104 MMOL/L — SIGNIFICANT CHANGE UP (ref 98–107)
CO2 SERPL-SCNC: 22 MMOL/L — SIGNIFICANT CHANGE UP (ref 22–31)
COVID-19 SPIKE DOMAIN AB INTERP: POSITIVE
COVID-19 SPIKE DOMAIN ANTIBODY RESULT: >250 U/ML — HIGH
CREAT SERPL-MCNC: 0.86 MG/DL — SIGNIFICANT CHANGE UP (ref 0.5–1.3)
GLUCOSE SERPL-MCNC: 154 MG/DL — HIGH (ref 70–99)
HCT VFR BLD CALC: 42.3 % — SIGNIFICANT CHANGE UP (ref 39–50)
HGB BLD-MCNC: 13.9 G/DL — SIGNIFICANT CHANGE UP (ref 13–17)
MAGNESIUM SERPL-MCNC: 2 MG/DL — SIGNIFICANT CHANGE UP (ref 1.6–2.6)
MCHC RBC-ENTMCNC: 30.3 PG — SIGNIFICANT CHANGE UP (ref 27–34)
MCHC RBC-ENTMCNC: 32.9 GM/DL — SIGNIFICANT CHANGE UP (ref 32–36)
MCV RBC AUTO: 92.4 FL — SIGNIFICANT CHANGE UP (ref 80–100)
NRBC # BLD: 0 /100 WBCS — SIGNIFICANT CHANGE UP
NRBC # FLD: 0 K/UL — SIGNIFICANT CHANGE UP
PHOSPHATE SERPL-MCNC: 2.2 MG/DL — LOW (ref 2.5–4.5)
PLATELET # BLD AUTO: 217 K/UL — SIGNIFICANT CHANGE UP (ref 150–400)
POTASSIUM SERPL-MCNC: 4.2 MMOL/L — SIGNIFICANT CHANGE UP (ref 3.5–5.3)
POTASSIUM SERPL-SCNC: 4.2 MMOL/L — SIGNIFICANT CHANGE UP (ref 3.5–5.3)
RBC # BLD: 4.58 M/UL — SIGNIFICANT CHANGE UP (ref 4.2–5.8)
RBC # FLD: 12.3 % — SIGNIFICANT CHANGE UP (ref 10.3–14.5)
SARS-COV-2 IGG+IGM SERPL QL IA: >250 U/ML — HIGH
SARS-COV-2 IGG+IGM SERPL QL IA: POSITIVE
SODIUM SERPL-SCNC: 139 MMOL/L — SIGNIFICANT CHANGE UP (ref 135–145)
WBC # BLD: 11.38 K/UL — HIGH (ref 3.8–10.5)
WBC # FLD AUTO: 11.38 K/UL — HIGH (ref 3.8–10.5)

## 2021-05-27 RX ORDER — ACETAMINOPHEN 500 MG
1000 TABLET ORAL ONCE
Refills: 0 | Status: COMPLETED | OUTPATIENT
Start: 2021-05-27 | End: 2021-05-27

## 2021-05-27 RX ORDER — ACETAMINOPHEN 500 MG
650 TABLET ORAL EVERY 6 HOURS
Refills: 0 | Status: DISCONTINUED | OUTPATIENT
Start: 2021-05-27 | End: 2021-05-29

## 2021-05-27 RX ORDER — OXYCODONE HYDROCHLORIDE 5 MG/1
10 TABLET ORAL EVERY 6 HOURS
Refills: 0 | Status: DISCONTINUED | OUTPATIENT
Start: 2021-05-27 | End: 2021-05-29

## 2021-05-27 RX ORDER — SENNA PLUS 8.6 MG/1
15 TABLET ORAL
Refills: 0 | Status: DISCONTINUED | OUTPATIENT
Start: 2021-05-27 | End: 2021-05-29

## 2021-05-27 RX ORDER — POLYETHYLENE GLYCOL 3350 17 G/17G
17 POWDER, FOR SOLUTION ORAL DAILY
Refills: 0 | Status: DISCONTINUED | OUTPATIENT
Start: 2021-05-27 | End: 2021-05-29

## 2021-05-27 RX ORDER — OXYCODONE HYDROCHLORIDE 5 MG/1
5 TABLET ORAL EVERY 6 HOURS
Refills: 0 | Status: DISCONTINUED | OUTPATIENT
Start: 2021-05-27 | End: 2021-05-29

## 2021-05-27 RX ADMIN — Medication 1000 MILLIGRAM(S): at 20:20

## 2021-05-27 RX ADMIN — Medication 63.75 MILLIMOLE(S): at 14:29

## 2021-05-27 RX ADMIN — Medication 400 MILLIGRAM(S): at 19:50

## 2021-05-27 RX ADMIN — Medication 100 MILLIGRAM(S): at 05:31

## 2021-05-27 RX ADMIN — Medication 102 MILLIGRAM(S): at 12:03

## 2021-05-27 RX ADMIN — SODIUM CHLORIDE 100 MILLILITER(S): 9 INJECTION, SOLUTION INTRAVENOUS at 21:01

## 2021-05-27 RX ADMIN — Medication 102 MILLIGRAM(S): at 05:31

## 2021-05-27 RX ADMIN — Medication 100 MILLIGRAM(S): at 14:30

## 2021-05-27 NOTE — SWALLOW BEDSIDE ASSESSMENT ADULT - ADDITIONAL RECOMMENDATIONS
1. Monitor for PO tolerance/intake  2. Patient will benefit from follow up at the [facility] pending discharge plan for dysphagia management

## 2021-05-27 NOTE — PROGRESS NOTE ADULT - ASSESSMENT
A/P: 40M s/p TORS L tonsillectomy, pharyngectomy and partial glossectomy with L SND - adequately recovering    - NPO/IVF till SLP eval  - Pain/nausea control  - EDWARD care  - Ambulate as tolerated  - Monitor for intraoral bleeding A/P: 40M s/p TORS L tonsillectomy, pharyngectomy and partial glossectomy with L SND - adequately recovering    - NPO/IVF till SLP eval  - Pain/nausea control  - EDWARD care  - Ambulate as tolerated  - Monitor for intraoral bleeding  - Finish decadron 4x and Ancef for 1d

## 2021-05-27 NOTE — SWALLOW BEDSIDE ASSESSMENT ADULT - SWALLOW EVAL: DIAGNOSIS
Patient presents with oropharyngeal dysphagia marked by adequate stripping of bolus from utensil, adequate oral containment, adequate bolus manipulation and functional oral transit time. There was laryngeal elevation upon digital palpation with initiation of pharyngeal swallow. Patient endorses sensation of pharyngeal stasis with 1x episode of throat clear response on the initial trials of puree consistency and honey thick liquids. Patient denies sensation of pharyngeal stasis as trials progressed and with use of thin liquid wash. There were no further overt s/s of laryngeal penetration/aspiration for puree consistency, honey thick liquids, nectar thick liquids and thin liquids. Patient verbally declined solid PO trials.

## 2021-05-27 NOTE — PROGRESS NOTE ADULT - SUBJECTIVE AND OBJECTIVE BOX
ENT PROGRESS NOTE    40M s/p TORS tonsillectomy, pharyngectomy and partial glossectomy with L SND.    INTERVAL:    5/27: NAEON. Some bleeding orally overnight but since resolved. Currently NPO, will get SLP eval today.    ICU Vital Signs Last 24 Hrs  T(C): 36.6 (27 May 2021 10:12), Max: 37.3 (27 May 2021 02:10)  T(F): 97.8 (27 May 2021 10:12), Max: 99.1 (27 May 2021 02:10)  HR: 72 (27 May 2021 10:12) (72 - 98)  BP: 122/82 (27 May 2021 10:12) (107/66 - 137/76)  BP(mean): 88 (26 May 2021 19:00) (60 - 90)  ABP: --  ABP(mean): --  RR: 18 (27 May 2021 10:12) (12 - 20)  SpO2: 95% (27 May 2021 10:12) (95% - 100%)    PHYSICAL EXAM:    CONSTITUTIONAL: Well nourished, well developed, NON-DYSMORPHIC, and in no acute distress.    HEAD: normocephalic, atraumatic.  EARS: The right/left pinna was normal.   NOSE: Normal external nose.   ORAL CAVITY/OROPHARYNX: No active bleeding, small blood-tinged streak on L tonsil fossa.  NECK: No cervical lymphadenopathy  RESPIRATORY: Respirations unlabored, no increased work of breathing with use of accessory muscles and retractions. No stridor.  CARDIAC: Warm extremities, no cyanosis.      ENT PROGRESS NOTE    40M s/p TORS tonsillectomy, pharyngectomy and partial glossectomy with L SND.    INTERVAL:    5/27: NAEON. Some bleeding orally overnight but since resolved. Currently NPO, will get SLP eval today.    ICU Vital Signs Last 24 Hrs  T(C): 36.6 (27 May 2021 10:12), Max: 37.3 (27 May 2021 02:10)  T(F): 97.8 (27 May 2021 10:12), Max: 99.1 (27 May 2021 02:10)  HR: 72 (27 May 2021 10:12) (72 - 98)  BP: 122/82 (27 May 2021 10:12) (107/66 - 137/76)  BP(mean): 88 (26 May 2021 19:00) (60 - 90)  ABP: --  ABP(mean): --  RR: 18 (27 May 2021 10:12) (12 - 20)  SpO2: 95% (27 May 2021 10:12) (95% - 100%)    PHYSICAL EXAM:    CONSTITUTIONAL: Well nourished, well developed, NON-DYSMORPHIC, and in no acute distress.    HEAD: normocephalic, atraumatic.  EARS: The right/left pinna was normal.   NOSE: Normal external nose.   ORAL CAVITY/OROPHARYNX: No active bleeding, small blood-tinged streak on L tonsil fossa.  NECK: Neck incision c/d/i. EDWARD 1x ss.  RESPIRATORY: Respirations unlabored, no increased work of breathing with use of accessory muscles and retractions. No stridor.  CARDIAC: Warm extremities, no cyanosis.

## 2021-05-27 NOTE — SWALLOW BEDSIDE ASSESSMENT ADULT - NS SPL SWALLOW CLINIC TRIAL FT
c/o pharyngeal stasis on initial trials of puree consistency and thin liquids which patient reports resolved as trials progressed

## 2021-05-27 NOTE — SWALLOW BEDSIDE ASSESSMENT ADULT - COMMENTS
ENT Note 5/27/21: 40M s/p TORS L tonsillectomy, pharyngectomy and partial glossectomy with L SND - adequately recovering    CXR 5/26/21 results pending     Patient was seen upright at bedside with surgical site noted on left lateral neck and EDWARD drain in place. Spouse present for assessment. Patient was alert/awake and easily engaged in conversation. Patient was able to follow simple directions. Patient endorses discomfort when swallowing saliva. Spouse expresses concern regarding nutritional intake and reports poor PO prior to hospitalization.

## 2021-05-28 RX ORDER — PANTOPRAZOLE SODIUM 20 MG/1
40 TABLET, DELAYED RELEASE ORAL DAILY
Refills: 0 | Status: DISCONTINUED | OUTPATIENT
Start: 2021-05-28 | End: 2021-05-29

## 2021-05-28 RX ORDER — LANOLIN ALCOHOL/MO/W.PET/CERES
3 CREAM (GRAM) TOPICAL AT BEDTIME
Refills: 0 | Status: DISCONTINUED | OUTPATIENT
Start: 2021-05-28 | End: 2021-05-29

## 2021-05-28 RX ADMIN — Medication 3 MILLIGRAM(S): at 22:47

## 2021-05-28 RX ADMIN — Medication 650 MILLIGRAM(S): at 18:35

## 2021-05-28 RX ADMIN — Medication 650 MILLIGRAM(S): at 19:07

## 2021-05-28 NOTE — PROGRESS NOTE ADULT - SUBJECTIVE AND OBJECTIVE BOX
ENT PROGRESS NOTE    40M s/p TORS tonsillectomy, pharyngectomy and partial glossectomy with L SND.    INTERVAL:    5/27: NAEON. Some bleeding orally overnight but since resolved. Currently NPO, will get SLP eval today.  5/28: NAEON. Tolerating puree/thin diet per SLP.    ICU Vital Signs Last 24 Hrs  T(C): 36.4 (28 May 2021 09:18), Max: 36.9 (27 May 2021 17:55)  T(F): 97.6 (28 May 2021 09:18), Max: 98.4 (27 May 2021 17:55)  HR: 67 (28 May 2021 09:18) (61 - 80)  BP: 106/65 (28 May 2021 09:18) (106/65 - 135/86)  BP(mean): --  ABP: --  ABP(mean): --  RR: 18 (28 May 2021 09:18) (17 - 18)  SpO2: 98% (28 May 2021 09:18) (95% - 99%)      PHYSICAL EXAM:    CONSTITUTIONAL: Well nourished, well developed, NON-DYSMORPHIC, and in no acute distress.    HEAD: normocephalic, atraumatic.  EARS: The right/left pinna was normal.   NOSE: Normal external nose.   ORAL CAVITY/OROPHARYNX: No active bleeding, small blood-tinged streak on L tonsil fossa.  NECK: Neck incision c/d/i. EDWARD 1x ss.  RESPIRATORY: Respirations unlabored, no increased work of breathing with use of accessory muscles and retractions. No stridor.  CARDIAC: Warm extremities, no cyanosis.

## 2021-05-28 NOTE — PROGRESS NOTE ADULT - ASSESSMENT
A/P: 40M s/p TORS L tonsillectomy, pharyngectomy and partial glossectomy with L SND - adequately recovering    - Puree/thin liq  - Pain/nausea control  - EDWARD care  - Ambulate as tolerated  - Monitor for intraoral bleeding  - S/p decadron 4x and Ancef for 1d

## 2021-05-29 ENCOUNTER — TRANSCRIPTION ENCOUNTER (OUTPATIENT)
Age: 41
End: 2021-05-29

## 2021-05-29 VITALS
TEMPERATURE: 99 F | OXYGEN SATURATION: 98 % | HEART RATE: 74 BPM | RESPIRATION RATE: 18 BRPM | DIASTOLIC BLOOD PRESSURE: 73 MMHG | SYSTOLIC BLOOD PRESSURE: 118 MMHG

## 2021-05-29 RX ORDER — ACETAMINOPHEN 500 MG
20.31 TABLET ORAL
Qty: 568.68 | Refills: 0
Start: 2021-05-29 | End: 2021-06-04

## 2021-05-29 RX ORDER — OXYCODONE HYDROCHLORIDE 5 MG/1
5 TABLET ORAL
Qty: 50 | Refills: 0
Start: 2021-05-29 | End: 2021-06-02

## 2021-05-29 RX ADMIN — OXYCODONE HYDROCHLORIDE 5 MILLIGRAM(S): 5 TABLET ORAL at 14:27

## 2021-05-29 RX ADMIN — OXYCODONE HYDROCHLORIDE 5 MILLIGRAM(S): 5 TABLET ORAL at 13:57

## 2021-05-29 RX ADMIN — PANTOPRAZOLE SODIUM 40 MILLIGRAM(S): 20 TABLET, DELAYED RELEASE ORAL at 12:27

## 2021-05-29 NOTE — DISCHARGE NOTE NURSING/CASE MANAGEMENT/SOCIAL WORK - PATIENT PORTAL LINK FT
You can access the FollowMyHealth Patient Portal offered by [facility] by registering at the following website: [email or web address]. By joining [employer]’s FollowMyHealth portal, you will also be able to view your health information using other applications (apps) compatible with our system.

## 2021-05-29 NOTE — DISCHARGE NOTE NURSING/CASE MANAGEMENT/SOCIAL WORK - NSDCPNINST_GEN_ALL_CORE
Follow up with provider as instructed. If taking opioid pain medications, please do not operate a motor vehicle, heavy machinery, drink alcohol or make important decisions. You may return to your activities of daily living, as tolerated. You may shower. Do not scrub incisions, allow water to flow over and pat dry

## 2021-05-29 NOTE — PROGRESS NOTE ADULT - SUBJECTIVE AND OBJECTIVE BOX
ENT PROGRESS NOTE    40M s/p TORS tonsillectomy, pharyngectomy and partial glossectomy with L SND.    INTERVAL:    5/27: NAEON. Some bleeding orally overnight but since resolved. Currently NPO, will get SLP eval today.  5/28: NAEON. Tolerating puree/thin diet per SLP.  5/29: naeon. tolerating PO     Vital Signs Last 24 Hrs  T(C): 36.9 (29 May 2021 10:15), Max: 37 (28 May 2021 21:46)  T(F): 98.4 (29 May 2021 10:15), Max: 98.6 (28 May 2021 21:46)  HR: 81 (29 May 2021 10:15) (59 - 81)  BP: 105/61 (29 May 2021 10:15) (105/61 - 126/80)  BP(mean): --  RR: 17 (29 May 2021 10:15) (17 - 18)  SpO2: 97% (29 May 2021 10:15) (96% - 100%)    PHYSICAL EXAM:    CONSTITUTIONAL: Well nourished, well developed, NON-DYSMORPHIC, and in no acute distress.    HEAD: normocephalic, atraumatic.  EARS: The right/left pinna was normal.   NOSE: Normal external nose.   ORAL CAVITY/OROPHARYNX: No active bleeding, small blood-tinged streak on L tonsil fossa.  NECK: Neck incision c/d/i. EDWARD 1x ss.  RESPIRATORY: Respirations unlabored, no increased work of breathing with use of accessory muscles and retractions. No stridor.  CARDIAC: Warm extremities, no cyanosis.     A/P: 40M s/p TORS L tonsillectomy, pharyngectomy and partial glossectomy with L SND - adequately recovering    - Puree/thin liq  - Pain/nausea control  - EDWARD care  - Ambulate as tolerated  - Monitor for intraoral bleeding  - S/p decadron 4x and Ancef for 1d

## 2021-05-29 NOTE — DISCHARGE NOTE PROVIDER - CARE PROVIDER_API CALL
Mainor Meek)  Otolaryngology  [address] [place] [address]  Phone: [phone]  Fax: [phone]  Follow Up Time:

## 2021-05-29 NOTE — DISCHARGE NOTE PROVIDER - HOSPITAL COURSE
40M s/p TORS tonsillectomy, pharyngectomy and partial glossectomy with L SND.    INTERVAL:    5/27: NAEON. Some bleeding orally overnight but since resolved. Currently NPO, will get SLP eval today.  5/28: NAEON. Tolerating puree/thin diet per SLP.  5/29: naeon. tolerating PO   Patient's post-operative course was uncomplicated. Diet was advanced as tolerated and pain was well controlled on medication. On day of discharge, pt deemed stable and ready to return home with plan to follow up as an outpatient.

## 2021-05-29 NOTE — DISCHARGE NOTE PROVIDER - NSDCMRMEDTOKEN_GEN_ALL_CORE_FT
acetaminophen 160 mg/5 mL oral suspension: 20.31 milliliter(s) orally every 6 hours, As needed, Mild Pain (1 - 3)  Marinol: 1 tab(s) orally once a day (at bedtime)  oxyCODONE 5 mg/5 mL oral solution: 5 milliliter(s) orally every 6 hours MDD:20mL

## 2021-05-29 NOTE — DISCHARGE NOTE PROVIDER - NSDCCPCAREPLAN_GEN_ALL_CORE_FT
PRINCIPAL DISCHARGE DIAGNOSIS  Diagnosis: S/P tonsillectomy  Assessment and Plan of Treatment:

## 2021-05-29 NOTE — DISCHARGE NOTE PROVIDER - NSDCFUADDINST_GEN_ALL_CORE_FT
Oxycodone and tylenol for pain.     Activity: No heavy lifting or strenuous activity. Walk as tolerated. Physical therapy per routine. Wound Care: Wash your wound Daily with soap and water daily. Pat dry. You may shower. No baths, hot tubs or swimming until approved by your surgeon. Follow up 1 - 2 weeks after discharge. Call office for appointment.  Office: [phone].  Call office for fever >101.5 or redness or drainage from wound.

## 2021-06-01 DIAGNOSIS — G89.18 OTHER ACUTE POSTPROCEDURAL PAIN: ICD-10-CM

## 2021-06-08 LAB — SURGICAL PATHOLOGY STUDY: SIGNIFICANT CHANGE UP

## 2021-06-15 ENCOUNTER — APPOINTMENT (OUTPATIENT)
Dept: OTOLARYNGOLOGY | Facility: CLINIC | Age: 41
End: 2021-06-15
Payer: COMMERCIAL

## 2021-06-15 VITALS
BODY MASS INDEX: 27.31 KG/M2 | DIASTOLIC BLOOD PRESSURE: 78 MMHG | SYSTOLIC BLOOD PRESSURE: 123 MMHG | HEIGHT: 67 IN | OXYGEN SATURATION: 96 % | HEART RATE: 72 BPM | WEIGHT: 174 LBS

## 2021-06-15 VITALS — TEMPERATURE: 97.2 F

## 2021-06-15 PROCEDURE — 99024 POSTOP FOLLOW-UP VISIT: CPT

## 2021-06-15 NOTE — REASON FOR VISIT
[FreeTextEntry2] : S/P Transoral radical tonsillectomy; limited pharyngectomy; partial glossectomy; reconstruction of tonsil defect with left buccal fat pad flap; left neck dissection levels 2, 3 and 4 on 5/26/2021.

## 2021-06-15 NOTE — CONSULT LETTER
[Dear  ___] : Dear  [unfilled], [Courtesy Letter:] : I had the pleasure of seeing your patient, [unfilled], in my office today. [Please see my note below.] : Please see my note below. [Consult Closing:] : Thank you very much for allowing me to participate in the care of this patient.  If you have any questions, please do not hesitate to contact me. [Sincerely,] : Sincerely, [FreeTextEntry2] : Dr John Cherry. [FreeTextEntry3] : \par Mainor Meek MD, FACS\par \par Otolaryngology-Head and Neck Surgery\par [employer] [facility]\par

## 2021-06-15 NOTE — PHYSICAL EXAM
[Midline] : trachea located in midline position [Normal] : no rashes [de-identified] : Incision C/D/I. [de-identified] : Tonsillar fossa almost completely healed.

## 2021-06-15 NOTE — HISTORY OF PRESENT ILLNESS
[de-identified] : 40 yro pt referred by Dr. Cherry for eval of left neck mass that was positive for carcinoma on FNA. Pt here for f/up s/p Transoral radical tonsillectomy; limited pharyngectomy; partial glossectomy; reconstruction of tonsil defect with left buccal fat pad flap; left neck dissection levels 2, 3 and 4 on 5/26/21. Also s/p DL with biopsy 4/30/2021.  \par \par Surgical Path:  Final Diagnosis\par 1.  Left tonsil, Tonsillectomy\par - Invasive squamous cell carcinoma , see synoptic summary\par - In this specimen, all surgical margins are negative for\par lesion however tumor is located 1.0 mm from the deep margin and\par 1.2-1.5 mm from the medial margin ( microscopic examination\par findings)\par \par 2- Deep medial margin of left tonsil, Excision\par - Benign skeletal muscle and fibroconnective tissue\par \par 3- Left neck dissection level 2\par - One of eleven lymph nodes is positive for metastatic squamous\par cell carcinoma without perinodal tumor extension ( 1.7 cm focus)\par \par 4- Left neck dissection level 3\par - Six reactive lymph nodes, negative for carcinoma\par \par 5- Left neck dissection level 4\par - Five reactive lymph nodes, negative for carcinoma\par S/P Robotic tonsillectomy and ND on 5/26/21. Healing well. Swallowing improving. On Tylenol 2x/day.\par \par

## 2021-06-21 NOTE — REVIEW OF SYSTEMS
Patient: Yanet Berg  LAPAROSCOPIC RIGHT HEMICOLECTOMY  Anesthesia type: general    Patient location: PACU  Last vitals:   Vitals:    11/15/18 1145   BP: 164/64   Pulse: 76   Resp: 15   Temp:    SpO2: 100%     Post vital signs: stable  Level of consciousness: awake and responds to simple questions  Post-anesthesia pain: pain controlled  Post-anesthesia nausea and vomiting: no  Pulmonary: unassisted, spontaneous ventilation, nasal cannula  Cardiovascular: stable and blood pressure at baseline  Hydration: adequate  Anesthetic events: no    QCDR Measures:  ASA# 11 - Luz Maria-op Cardiac Arrest: ASA11B - Patient did NOT experience unanticipated cardiac arrest  ASA# 12 - Luz Maria-op Mortality Rate: ASA12B - Patient did NOT die  ASA# 13 - PACU Re-Intubation Rate: ASA13B - Patient did NOT require a new airway mgmt  ASA# 10 - Composite Anes Safety: ASA10A - No serious adverse event    Additional Notes: TAP block seem to be helping. Only complaint is of back pain. Tolerated anesthetic well.    [As Noted in HPI] : as noted in HPI [Vomiting] : no vomiting [Abdominal Pain] : abdominal pain [Diarrhea] : no diarrhea [Heartburn] : no heartburn [Constipation] : no constipation [Melena] : no melena [Negative] : Heme/Lymph

## 2021-08-26 ENCOUNTER — APPOINTMENT (OUTPATIENT)
Dept: OTOLARYNGOLOGY | Facility: CLINIC | Age: 41
End: 2021-08-26
Payer: COMMERCIAL

## 2021-08-26 VITALS — WEIGHT: 180 LBS | BODY MASS INDEX: 28.93 KG/M2 | HEIGHT: 66 IN

## 2021-08-26 DIAGNOSIS — Z92.29 PERSONAL HISTORY OF OTHER DRUG THERAPY: ICD-10-CM

## 2021-08-26 DIAGNOSIS — Z86.16 PERSONAL HISTORY OF COVID-19: ICD-10-CM

## 2021-08-26 PROCEDURE — 31575 DIAGNOSTIC LARYNGOSCOPY: CPT

## 2021-08-26 PROCEDURE — 99214 OFFICE O/P EST MOD 30 MIN: CPT | Mod: 25

## 2021-08-26 RX ORDER — OXYCODONE HYDROCHLORIDE 5 MG/5ML
5 SOLUTION ORAL EVERY 6 HOURS
Qty: 140 | Refills: 0 | Status: COMPLETED | COMMUNITY
Start: 2021-06-01 | End: 2021-08-26

## 2021-08-26 NOTE — PHYSICAL EXAM
[Midline] : trachea located in midline position [Normal] : no rashes [de-identified] : Scar tissue on the left tonsillar fossa with no signs of recurrence.

## 2021-08-26 NOTE — CONSULT LETTER
[Dear  ___] : Dear  [unfilled], [Courtesy Letter:] : I had the pleasure of seeing your patient, [unfilled], in my office today. [Please see my note below.] : Please see my note below. [Consult Closing:] : Thank you very much for allowing me to participate in the care of this patient.  If you have any questions, please do not hesitate to contact me. [Sincerely,] : Sincerely, [FreeTextEntry2] : Dr John Cherry.  [FreeTextEntry3] : \par Mainor Meek MD, FACS\par \par Otolaryngology-Head and Neck Surgery\par [employer] [facility]\par

## 2021-08-26 NOTE — HISTORY OF PRESENT ILLNESS
[de-identified] : 40 yro pt referred by Dr. Cherry for eval of left neck mass that was positive for carcinoma on FNA. Pt here for 2 month f/up, is s/p Transoral radical tonsillectomy; limited pharyngectomy; partial glossectomy; reconstruction of tonsil defect with left buccal fat pad flap; left neck dissection levels 2, 3 and 4 on 5/26/21 for tonsil cancer.\par \par Pt c/o tightness in left side neck near scar, also with numbness of left cheek and ear. Random episodes of choking coughing. States nose flap is loose, feels like something in throat is fluttering. \par Denies dysphagia, dyspnea, dysphonia, hemoptysis. No fever, chills, weight loss.

## 2021-10-28 ENCOUNTER — APPOINTMENT (OUTPATIENT)
Dept: OTOLARYNGOLOGY | Facility: CLINIC | Age: 41
End: 2021-10-28
Payer: COMMERCIAL

## 2021-10-28 VITALS
DIASTOLIC BLOOD PRESSURE: 71 MMHG | HEIGHT: 66 IN | SYSTOLIC BLOOD PRESSURE: 105 MMHG | BODY MASS INDEX: 28.93 KG/M2 | TEMPERATURE: 98 F | HEART RATE: 64 BPM | WEIGHT: 180 LBS

## 2021-10-28 PROCEDURE — 31575 DIAGNOSTIC LARYNGOSCOPY: CPT

## 2021-10-28 PROCEDURE — 99214 OFFICE O/P EST MOD 30 MIN: CPT | Mod: 25

## 2021-10-28 NOTE — CONSULT LETTER
[Dear  ___] : Dear  [unfilled], [Courtesy Letter:] : I had the pleasure of seeing your patient, [unfilled], in my office today. [Please see my note below.] : Please see my note below. [Consult Closing:] : Thank you very much for allowing me to participate in the care of this patient.  If you have any questions, please do not hesitate to contact me. [Sincerely,] : Sincerely, [FreeTextEntry2] : Dr John Cherry.  [FreeTextEntry3] : \par Mainor Meek MD, FACS\par \par Otolaryngology-Head and Neck Surgery\par [employer] at [facility]\par

## 2021-10-28 NOTE — HISTORY OF PRESENT ILLNESS
[de-identified] : 40 yro pt referred by Dr. Cherry for eval of left neck mass that was positive for carcinoma on FNA. Pt here for f/up s/p Transoral radical tonsillectomy; limited pharyngectomy; partial glossectomy; reconstruction of tonsil defect with left buccal fat pad flap; left neck dissection levels 2, 3 and 4 on 5/26/21. \par C/O some numbness of the L neck. Swallowing is good most of the time. \par Complete review of systems which was performed during a previous encounter was reviewed with the patient and there are no changes except as stated in the HPI section.\par \par \par Surgical Path:  Final Diagnosis\par 1.  Left tonsil, Tonsillectomy\par - Invasive squamous cell carcinoma , see synoptic summary\par - In this specimen, all surgical margins are negative for\par lesion however tumor is located 1.0 mm from the deep margin and\par 1.2-1.5 mm from the medial margin ( microscopic examination\par findings)\par \par 2- Deep medial margin of left tonsil, Excision\par - Benign skeletal muscle and fibroconnective tissue\par \par 3- Left neck dissection level 2\par - One of eleven lymph nodes is positive for metastatic squamous\par cell carcinoma without perinodal tumor extension ( 1.7 cm focus)\par \par 4- Left neck dissection level 3\par - Six reactive lymph nodes, negative for carcinoma\par \par 5- Left neck dissection level 4\par - Five reactive lymph nodes, negative for carcinoma\par S/P Robotic tonsillectomy and ND on 5/26/21. Healing well. Swallowing improving. On Tylenol 2x/day.\par \par

## 2021-10-28 NOTE — PHYSICAL EXAM
[Midline] : trachea located in midline position [Normal] : no rashes [de-identified] : Incision well healed. PALLAVI [de-identified] : Scar tissue on the left tonsillar fossa with no signs of recurrence.

## 2021-10-28 NOTE — REASON FOR VISIT
[Subsequent Evaluation] : a subsequent evaluation for [FreeTextEntry2] : follow up for tonsil cancer

## 2021-11-26 ENCOUNTER — OUTPATIENT (OUTPATIENT)
Dept: OUTPATIENT SERVICES | Facility: HOSPITAL | Age: 41
LOS: 1 days | End: 2021-11-26
Payer: COMMERCIAL

## 2021-11-26 ENCOUNTER — APPOINTMENT (OUTPATIENT)
Dept: CT IMAGING | Facility: IMAGING CENTER | Age: 41
End: 2021-11-26
Payer: COMMERCIAL

## 2021-11-26 DIAGNOSIS — D49.0 NEOPLASM OF UNSPECIFIED BEHAVIOR OF DIGESTIVE SYSTEM: Chronic | ICD-10-CM

## 2021-11-26 DIAGNOSIS — Z98.890 OTHER SPECIFIED POSTPROCEDURAL STATES: Chronic | ICD-10-CM

## 2021-11-26 DIAGNOSIS — C09.9 MALIGNANT NEOPLASM OF TONSIL, UNSPECIFIED: ICD-10-CM

## 2021-11-26 DIAGNOSIS — Z00.8 ENCOUNTER FOR OTHER GENERAL EXAMINATION: ICD-10-CM

## 2021-11-26 PROCEDURE — 70491 CT SOFT TISSUE NECK W/DYE: CPT | Mod: 26

## 2021-11-26 PROCEDURE — 70491 CT SOFT TISSUE NECK W/DYE: CPT

## 2021-12-09 ENCOUNTER — NON-APPOINTMENT (OUTPATIENT)
Age: 41
End: 2021-12-09

## 2022-01-01 NOTE — PRE-ANESTHESIA EVALUATION ADULT - BP NONINVASIVE SYSTOLIC (MM HG)
MOB and FOB called at 0100 to receive update on infant. Writer updated MOB and FOB of infant's weight, NAUN scores, and plan of care. 125

## 2022-03-10 ENCOUNTER — APPOINTMENT (OUTPATIENT)
Dept: OTOLARYNGOLOGY | Facility: CLINIC | Age: 42
End: 2022-03-10
Payer: COMMERCIAL

## 2022-03-10 VITALS
BODY MASS INDEX: 28.12 KG/M2 | WEIGHT: 175 LBS | SYSTOLIC BLOOD PRESSURE: 110 MMHG | HEIGHT: 66 IN | DIASTOLIC BLOOD PRESSURE: 69 MMHG | HEART RATE: 78 BPM

## 2022-03-10 PROCEDURE — 99214 OFFICE O/P EST MOD 30 MIN: CPT

## 2022-03-10 NOTE — PHYSICAL EXAM
[de-identified] : Incision well healed. PALLAVI [Midline] : trachea located in midline position [de-identified] : Scar tissue on the left tonsillar fossa with no signs of recurrence. [Normal] : no rashes

## 2022-03-10 NOTE — HISTORY OF PRESENT ILLNESS
[de-identified] : 40 ear olf male with history of SCCa of the Left tonsil referred by Dr. Cherry. s/p Transoral radical tonsillectomy; limited pharyngectomy; partial glossectomy; reconstruction of tonsil defect with left buccal fat pad flap; left neck dissection levels 2, 3 and 4 on 5/26/21. Presents today for 3 month follow up. States feeling well over all, no complaints. Denies dysphagia, odynophagia, dyspnea and dysphonia.  No fever/ chills or weight loss. Eating and drinking without difficulty.\par \par CT Neck w/ iv cont 11/26/2021 IMPRESSION:\par 1.  Expected postsurgical changes from interval left radical tonsillectomy with neck dissection for resection of previously seen hypermetabolic left palatine/glossotonsillar neoplasm and metastatic left level 2A cervical lymph node. No focal masslike enhancement within the surgical bed to suggest recurrent disease. Continued clinical and imaging surveillance should be considered.\par 2.  No pathologic lymphadenopathy by size criteria within left level 2A-4 cervical dissection surgical bed to suggest metastatic disease. Small scattered cervical lymph nodes elsewhere are nonspecific and possibly reactive but amenable to follow-up.\par \par \par Surgical Path:  Final Diagnosis\par 1.  Left tonsil, Tonsillectomy\par - Invasive squamous cell carcinoma , see synoptic summary\par - In this specimen, all surgical margins are negative for\par lesion however tumor is located 1.0 mm from the deep margin and\par 1.2-1.5 mm from the medial margin ( microscopic examination\par findings)\par 2- Deep medial margin of left tonsil, Excision\par - Benign skeletal muscle and fibroconnective tissue\par 3- Left neck dissection level 2\par - One of eleven lymph nodes is positive for metastatic squamous\par cell carcinoma without perinodal tumor extension ( 1.7 cm focus)\par 4- Left neck dissection level 3\par - Six reactive lymph nodes, negative for carcinoma\par 5- Left neck dissection level 4\par - Five reactive lymph nodes, negative for carcinoma\par \par Complete review of systems which was performed during a previous encounter was reviewed with the patient and there are no changes except as stated in the HPI section.\par

## 2022-03-10 NOTE — CONSULT LETTER
[Dear  ___] : Dear  [unfilled], [Courtesy Letter:] : I had the pleasure of seeing your patient, [unfilled], in my office today. [Please see my note below.] : Please see my note below. [Consult Closing:] : Thank you very much for allowing me to participate in the care of this patient.  If you have any questions, please do not hesitate to contact me. [Sincerely,] : Sincerely, [FreeTextEntry2] : Dr John Cherry.  [FreeTextEntry3] : \par Mainor Meek MD, FACS\par \par Otolaryngology-Head and Neck Surgery\par [employer] [facility]\par  No

## 2022-04-25 NOTE — H&P PST ADULT - HISTORY OF PRESENT ILLNESS
Declines 39y/o male scheduled for radial resection tonsillectomy with closure with closure with local flap pharyngectomy limited partial glossectomy robotic procedure left neck dissection on 5/26/2021.  Pt states, "felt enlarged lymph node left side of neck, cat scan was done bx positive for malignancy, pet scan showed abnormality in tonsil, bx positive for malignancy." 39y/o male scheduled for radial resection tonsillectomy with closure with local flap pharyngectomy limited partial glossectomy robotic procedure left neck dissection on 5/26/2021.  Pt states, "felt enlarged lymph node left side of neck, cat scan was done bx positive for malignancy, pet scan showed abnormality in tonsil, bx positive for malignancy."

## 2022-05-19 ENCOUNTER — APPOINTMENT (OUTPATIENT)
Dept: OTOLARYNGOLOGY | Facility: CLINIC | Age: 42
End: 2022-05-19

## 2023-03-30 ENCOUNTER — APPOINTMENT (OUTPATIENT)
Dept: OTOLARYNGOLOGY | Facility: CLINIC | Age: 43
End: 2023-03-30
Payer: COMMERCIAL

## 2023-03-30 VITALS — SYSTOLIC BLOOD PRESSURE: 119 MMHG | DIASTOLIC BLOOD PRESSURE: 78 MMHG | HEART RATE: 69 BPM

## 2023-03-30 DIAGNOSIS — C76.0 MALIGNANT NEOPLASM OF HEAD, FACE AND NECK: ICD-10-CM

## 2023-03-30 PROCEDURE — 99214 OFFICE O/P EST MOD 30 MIN: CPT | Mod: 25

## 2023-03-30 PROCEDURE — 31575 DIAGNOSTIC LARYNGOSCOPY: CPT

## 2023-03-30 NOTE — PHYSICAL EXAM
[Midline] : trachea located in midline position [Normal] : no rashes [de-identified] :  PALLAVI [de-identified] : Scar tissue on the left tonsillar fossa with no signs of recurrence.

## 2023-03-30 NOTE — HISTORY OF PRESENT ILLNESS
[de-identified] : 42 year old male presents for follow up of tonsil cancer\par s/p Transoral radical tonsillectomy; limited pharyngectomy; partial glossectomy; reconstruction of tonsil defect with left buccal fat pad flap; left neck dissection levels 2, 3 and 4 on 5/26/21. \par States left side of tongue is still constantly numb \par States feeling well over all, no complaints. \par Denies dysphagia, bleeding, throat infections, swelling, odynophagia, dyspnea and dysphonia. No fever/ chills or weight loss. Eating and drinking without difficulty.\par

## 2023-03-30 NOTE — CONSULT LETTER
[Dear  ___] : Dear  [unfilled], [Courtesy Letter:] : I had the pleasure of seeing your patient, [unfilled], in my office today. [Please see my note below.] : Please see my note below. [Consult Closing:] : Thank you very much for allowing me to participate in the care of this patient.  If you have any questions, please do not hesitate to contact me. [Sincerely,] : Sincerely, [FreeTextEntry2] : Dr John Cherry.  [FreeTextEntry3] : \par Mainor Meek MD, FACS\par \par Otolaryngology-Head and Neck Surgery\par [employer] [facility]\par

## 2023-04-13 ENCOUNTER — APPOINTMENT (OUTPATIENT)
Dept: CT IMAGING | Facility: IMAGING CENTER | Age: 43
End: 2023-04-13

## 2023-04-28 ENCOUNTER — APPOINTMENT (OUTPATIENT)
Dept: CT IMAGING | Facility: IMAGING CENTER | Age: 43
End: 2023-04-28
Payer: COMMERCIAL

## 2023-04-28 ENCOUNTER — OUTPATIENT (OUTPATIENT)
Dept: OUTPATIENT SERVICES | Facility: HOSPITAL | Age: 43
LOS: 1 days | End: 2023-04-28
Payer: COMMERCIAL

## 2023-04-28 DIAGNOSIS — D49.0 NEOPLASM OF UNSPECIFIED BEHAVIOR OF DIGESTIVE SYSTEM: Chronic | ICD-10-CM

## 2023-04-28 DIAGNOSIS — Z98.890 OTHER SPECIFIED POSTPROCEDURAL STATES: Chronic | ICD-10-CM

## 2023-04-28 DIAGNOSIS — C76.0 MALIGNANT NEOPLASM OF HEAD, FACE AND NECK: ICD-10-CM

## 2023-04-28 PROCEDURE — 71260 CT THORAX DX C+: CPT | Mod: 26

## 2023-04-28 PROCEDURE — 71260 CT THORAX DX C+: CPT

## 2023-04-28 PROCEDURE — 70491 CT SOFT TISSUE NECK W/DYE: CPT | Mod: 26

## 2023-04-28 PROCEDURE — 70491 CT SOFT TISSUE NECK W/DYE: CPT

## 2023-05-03 ENCOUNTER — NON-APPOINTMENT (OUTPATIENT)
Age: 43
End: 2023-05-03

## 2023-05-05 ENCOUNTER — NON-APPOINTMENT (OUTPATIENT)
Age: 43
End: 2023-05-05

## 2023-07-01 ENCOUNTER — APPOINTMENT (OUTPATIENT)
Dept: CT IMAGING | Facility: IMAGING CENTER | Age: 43
End: 2023-07-01
Payer: COMMERCIAL

## 2023-07-01 ENCOUNTER — OUTPATIENT (OUTPATIENT)
Dept: OUTPATIENT SERVICES | Facility: HOSPITAL | Age: 43
LOS: 1 days | End: 2023-07-01
Payer: COMMERCIAL

## 2023-07-01 DIAGNOSIS — C76.0 MALIGNANT NEOPLASM OF HEAD, FACE AND NECK: ICD-10-CM

## 2023-07-01 DIAGNOSIS — Z98.890 OTHER SPECIFIED POSTPROCEDURAL STATES: Chronic | ICD-10-CM

## 2023-07-01 DIAGNOSIS — D49.0 NEOPLASM OF UNSPECIFIED BEHAVIOR OF DIGESTIVE SYSTEM: Chronic | ICD-10-CM

## 2023-07-01 PROCEDURE — 71260 CT THORAX DX C+: CPT

## 2023-07-01 PROCEDURE — 71260 CT THORAX DX C+: CPT | Mod: 26

## 2023-07-17 ENCOUNTER — NON-APPOINTMENT (OUTPATIENT)
Age: 43
End: 2023-07-17

## 2023-07-20 ENCOUNTER — APPOINTMENT (OUTPATIENT)
Dept: OTOLARYNGOLOGY | Facility: CLINIC | Age: 43
End: 2023-07-20
Payer: COMMERCIAL

## 2023-07-20 VITALS
TEMPERATURE: 97.6 F | HEIGHT: 66 IN | HEART RATE: 70 BPM | DIASTOLIC BLOOD PRESSURE: 64 MMHG | BODY MASS INDEX: 27.97 KG/M2 | RESPIRATION RATE: 18 BRPM | OXYGEN SATURATION: 97 % | SYSTOLIC BLOOD PRESSURE: 105 MMHG | WEIGHT: 174 LBS

## 2023-07-20 DIAGNOSIS — R91.1 SOLITARY PULMONARY NODULE: ICD-10-CM

## 2023-07-20 DIAGNOSIS — C09.9 MALIGNANT NEOPLASM OF TONSIL, UNSPECIFIED: ICD-10-CM

## 2023-07-20 PROCEDURE — 99214 OFFICE O/P EST MOD 30 MIN: CPT

## 2023-07-20 NOTE — REASON FOR VISIT
[Subsequent Evaluation] : a subsequent evaluation for [Spouse] : spouse [FreeTextEntry2] : tonsil cancer

## 2023-07-20 NOTE — PHYSICAL EXAM
[Midline] : trachea located in midline position [Normal] : no rashes [de-identified] :  PALLAVI [de-identified] : Scar tissue on the left tonsillar fossa with no signs of recurrence.

## 2023-07-20 NOTE — CONSULT LETTER
[Dear  ___] : Dear  [unfilled], [Courtesy Letter:] : I had the pleasure of seeing your patient, [unfilled], in my office today. [Please see my note below.] : Please see my note below. [Consult Closing:] : Thank you very much for allowing me to participate in the care of this patient.  If you have any questions, please do not hesitate to contact me. [Sincerely,] : Sincerely, [FreeTextEntry2] : Dr John Cherry. [FreeTextEntry3] : \par Mainor Meek MD, FACS\par \par Otolaryngology-Head and Neck Surgery\par [employer] at [facility]\par

## 2023-07-20 NOTE — HISTORY OF PRESENT ILLNESS
[de-identified] : 42 year old male presents for follow up of tonsil cancer\par s/p Transoral radical tonsillectomy; limited pharyngectomy; partial glossectomy; reconstruction of tonsil defect with left buccal fat pad flap; left neck dissection levels 2, 3 and 4 on 5/26/21. \par States left side of tongue is still constantly numb \par States feeling well over all, Denies any new concerns. Eating and drinking without difficulty.\par No new lesions reported. \par Denies dysphagia, bleeding, throat infections, swelling, odynophagia, dyspnea and dysphonia. No fever/ chills or weight loss. \par \par Recent CT Chest 07/01/23 IMPRESSION: 3 mm left upper lobe apical pulmonary nodule is unchanged since April 28, 2023. An additional 6 month follow-up noncontrast chest CT can be performed to ensure stability.

## 2024-01-12 ENCOUNTER — APPOINTMENT (OUTPATIENT)
Dept: CT IMAGING | Facility: IMAGING CENTER | Age: 44
End: 2024-01-12
Payer: COMMERCIAL

## 2024-01-12 ENCOUNTER — OUTPATIENT (OUTPATIENT)
Dept: OUTPATIENT SERVICES | Facility: HOSPITAL | Age: 44
LOS: 1 days | End: 2024-01-12
Payer: COMMERCIAL

## 2024-01-12 DIAGNOSIS — Z98.890 OTHER SPECIFIED POSTPROCEDURAL STATES: Chronic | ICD-10-CM

## 2024-01-12 DIAGNOSIS — D49.0 NEOPLASM OF UNSPECIFIED BEHAVIOR OF DIGESTIVE SYSTEM: Chronic | ICD-10-CM

## 2024-01-12 DIAGNOSIS — Z00.8 ENCOUNTER FOR OTHER GENERAL EXAMINATION: ICD-10-CM

## 2024-01-12 DIAGNOSIS — C09.9 MALIGNANT NEOPLASM OF TONSIL, UNSPECIFIED: ICD-10-CM

## 2024-01-12 PROCEDURE — 70491 CT SOFT TISSUE NECK W/DYE: CPT

## 2024-01-12 PROCEDURE — 71260 CT THORAX DX C+: CPT

## 2024-01-12 PROCEDURE — 71260 CT THORAX DX C+: CPT | Mod: 26

## 2024-01-12 PROCEDURE — 70491 CT SOFT TISSUE NECK W/DYE: CPT | Mod: 26

## 2024-01-20 ENCOUNTER — APPOINTMENT (OUTPATIENT)
Dept: MRI IMAGING | Facility: CLINIC | Age: 44
End: 2024-01-20

## 2024-03-06 NOTE — H&P PST ADULT - PROBLEM/PLAN-2
Detail Level: Detailed
Quality 137: Melanoma: Continuity Of Care - Recall System: Patient information entered into a recall system that includes: target date for the next exam specified AND a process to follow up with patients regarding missed or unscheduled appointments
When Should The Patient Follow-Up For Their Next Full-Body Skin Exam?: 6 Months
Detail Level: Simple
Detail Level: Generalized
Detail Level: Zone
DISPLAY PLAN FREE TEXT

## 2024-06-17 ENCOUNTER — APPOINTMENT (OUTPATIENT)
Dept: ORTHOPEDIC SURGERY | Facility: CLINIC | Age: 44
End: 2024-06-17
Payer: COMMERCIAL

## 2024-06-17 VITALS — WEIGHT: 174 LBS | BODY MASS INDEX: 27.97 KG/M2 | HEIGHT: 66 IN

## 2024-06-17 DIAGNOSIS — M70.52 OTHER BURSITIS OF KNEE, LEFT KNEE: ICD-10-CM

## 2024-06-17 PROCEDURE — 99243 OFF/OP CNSLTJ NEW/EST LOW 30: CPT

## 2024-06-17 PROCEDURE — 73564 X-RAY EXAM KNEE 4 OR MORE: CPT | Mod: LT

## 2024-06-17 NOTE — DISCUSSION/SUMMARY
[de-identified] : ice  wear sleeve  poss asp if swells up more  use knee pad or gel pad when leans on it

## 2024-06-17 NOTE — HISTORY OF PRESENT ILLNESS
[Rest] : rest [Sudden] : sudden [6] : 6 [5] : 5 [Dull/Aching] : dull/aching [Tightness] : tightness [Intermittent] : intermittent [Meds] : meds [Ice] : ice [de-identified] : 43 year old male with pain in the left knee, symptoms started spontaneously over the weekend and has slightly gotten better. Knee feels tight, swollen. He was icing and taking NSAIDS with some help, no mechanical symptoms or prior history of injury.  [] : no [FreeTextEntry1] : LT KNEE  [FreeTextEntry5] : Patient states NO INJURY. pain began over the weekend and has gotten better. Some swelling.  [FreeTextEntry9] : motrin  [de-identified] : pressure or kneeling

## 2024-06-17 NOTE — PHYSICAL EXAM
[5___] : hamstring 5[unfilled]/5 [Left] : left knee [All Views] : anteroposterior, lateral, skyline, and anteroposterior standing [There are no fractures, subluxations or dislocations. No significant abnormalities are seen] : There are no fractures, subluxations or dislocations. No significant abnormalities are seen [] : pre-patella bursa effusion [TWNoteComboBox7] : flexion 120 degrees [de-identified] : extension 0 degrees

## 2024-09-11 ENCOUNTER — NON-APPOINTMENT (OUTPATIENT)
Age: 44
End: 2024-09-11

## 2024-09-12 ENCOUNTER — APPOINTMENT (OUTPATIENT)
Dept: OTOLARYNGOLOGY | Facility: CLINIC | Age: 44
End: 2024-09-12
Payer: COMMERCIAL

## 2024-09-12 VITALS
DIASTOLIC BLOOD PRESSURE: 70 MMHG | HEIGHT: 66 IN | WEIGHT: 170 LBS | HEART RATE: 70 BPM | SYSTOLIC BLOOD PRESSURE: 116 MMHG | BODY MASS INDEX: 27.32 KG/M2

## 2024-09-12 DIAGNOSIS — C09.9 MALIGNANT NEOPLASM OF TONSIL, UNSPECIFIED: ICD-10-CM

## 2024-09-12 DIAGNOSIS — R91.1 SOLITARY PULMONARY NODULE: ICD-10-CM

## 2024-09-12 PROCEDURE — 31575 DIAGNOSTIC LARYNGOSCOPY: CPT

## 2024-09-12 PROCEDURE — 99212 OFFICE O/P EST SF 10 MIN: CPT | Mod: 25

## 2024-09-12 NOTE — PHYSICAL EXAM
[Midline] : trachea located in midline position [Normal] : no rashes [de-identified] :  PALLAVI [de-identified] : Scar tissue on the left tonsillar fossa with no signs of recurrence.

## 2024-09-12 NOTE — CONSULT LETTER
[Dear  ___] : Dear  [unfilled], [Courtesy Letter:] : I had the pleasure of seeing your patient, [unfilled], in my office today. [Please see my note below.] : Please see my note below. [Consult Closing:] : Thank you very much for allowing me to participate in the care of this patient.  If you have any questions, please do not hesitate to contact me. [Sincerely,] : Sincerely, [FreeTextEntry2] : Dr John Cherry. [FreeTextEntry3] : \par  Mainor Meek MD, FACS\par  \par  Otolaryngology-Head and Neck Surgery\par  [employer] [facility]\par

## 2024-09-12 NOTE — HISTORY OF PRESENT ILLNESS
[de-identified] : 43 year old male presents for follow up of tonsil cancer s/p Transoral radical tonsillectomy; limited pharyngectomy; partial glossectomy; reconstruction of tonsil defect with left buccal fat pad flap; left neck dissection levels 2, 3 and 4 on 5/26/21.  Reports doing well overall.  States left neck/jaw continues to have numbness.  Denies any new concerns. Eating and drinking without difficulty. No new lesions reported.  Denies dysphagia, bleeding, throat infections, swelling, odynophagia, dyspnea and dysphonia.  No fever/chills or weight loss.    CT NECK SOFT TISSUE 01/13/24: IMPRESSION: Stable left TORS and selective neck dissection change. No recurrent mass or adenopathy.   CT CHEST W/ IV CONT 01/19/24: IMPRESSION: A 3 mm left apical nodule is unchanged since July 1, 2023. No new nodules.

## 2024-12-24 PROBLEM — F10.90 ALCOHOL USE: Status: ACTIVE | Noted: 2019-07-30

## 2025-02-20 ENCOUNTER — APPOINTMENT (OUTPATIENT)
Dept: OTOLARYNGOLOGY | Facility: CLINIC | Age: 45
End: 2025-02-20
Payer: COMMERCIAL

## 2025-02-20 VITALS
HEART RATE: 62 BPM | BODY MASS INDEX: 26.52 KG/M2 | DIASTOLIC BLOOD PRESSURE: 84 MMHG | WEIGHT: 165 LBS | OXYGEN SATURATION: 96 % | HEIGHT: 66 IN | SYSTOLIC BLOOD PRESSURE: 130 MMHG

## 2025-02-20 DIAGNOSIS — C09.9 MALIGNANT NEOPLASM OF TONSIL, UNSPECIFIED: ICD-10-CM

## 2025-02-20 PROCEDURE — 99212 OFFICE O/P EST SF 10 MIN: CPT

## 2025-05-27 ENCOUNTER — APPOINTMENT (OUTPATIENT)
Dept: CT IMAGING | Facility: IMAGING CENTER | Age: 45
End: 2025-05-27
Payer: COMMERCIAL

## 2025-05-27 ENCOUNTER — OUTPATIENT (OUTPATIENT)
Dept: OUTPATIENT SERVICES | Facility: HOSPITAL | Age: 45
LOS: 1 days | End: 2025-05-27
Payer: COMMERCIAL

## 2025-05-27 DIAGNOSIS — Z98.890 OTHER SPECIFIED POSTPROCEDURAL STATES: Chronic | ICD-10-CM

## 2025-05-27 DIAGNOSIS — D49.0 NEOPLASM OF UNSPECIFIED BEHAVIOR OF DIGESTIVE SYSTEM: Chronic | ICD-10-CM

## 2025-05-27 DIAGNOSIS — C09.9 MALIGNANT NEOPLASM OF TONSIL, UNSPECIFIED: ICD-10-CM

## 2025-05-27 DIAGNOSIS — Z00.8 ENCOUNTER FOR OTHER GENERAL EXAMINATION: ICD-10-CM

## 2025-05-27 PROCEDURE — 70491 CT SOFT TISSUE NECK W/DYE: CPT | Mod: 26

## 2025-05-27 PROCEDURE — 70491 CT SOFT TISSUE NECK W/DYE: CPT

## 2025-05-27 PROCEDURE — 71260 CT THORAX DX C+: CPT | Mod: 26

## 2025-05-27 PROCEDURE — 71260 CT THORAX DX C+: CPT

## 2025-07-21 ENCOUNTER — NON-APPOINTMENT (OUTPATIENT)
Age: 45
End: 2025-07-21

## 2025-07-21 ENCOUNTER — APPOINTMENT (OUTPATIENT)
Dept: SPINE | Facility: CLINIC | Age: 45
End: 2025-07-21
Payer: COMMERCIAL

## 2025-07-21 VITALS
SYSTOLIC BLOOD PRESSURE: 115 MMHG | WEIGHT: 165 LBS | RESPIRATION RATE: 18 BRPM | HEIGHT: 66 IN | DIASTOLIC BLOOD PRESSURE: 79 MMHG | HEART RATE: 78 BPM | OXYGEN SATURATION: 96 % | BODY MASS INDEX: 26.52 KG/M2

## 2025-07-21 DIAGNOSIS — M54.50 LOW BACK PAIN, UNSPECIFIED: ICD-10-CM

## 2025-07-21 PROCEDURE — 99203 OFFICE O/P NEW LOW 30 MIN: CPT

## 2025-08-21 ENCOUNTER — APPOINTMENT (OUTPATIENT)
Dept: OTOLARYNGOLOGY | Facility: CLINIC | Age: 45
End: 2025-08-21
Payer: COMMERCIAL

## 2025-08-21 DIAGNOSIS — C76.0 MALIGNANT NEOPLASM OF HEAD, FACE AND NECK: ICD-10-CM

## 2025-08-21 PROCEDURE — 31575 DIAGNOSTIC LARYNGOSCOPY: CPT

## 2025-08-21 PROCEDURE — 99212 OFFICE O/P EST SF 10 MIN: CPT | Mod: 25
